# Patient Record
Sex: MALE | Race: NATIVE HAWAIIAN OR OTHER PACIFIC ISLANDER | HISPANIC OR LATINO | Employment: UNEMPLOYED | URBAN - METROPOLITAN AREA
[De-identification: names, ages, dates, MRNs, and addresses within clinical notes are randomized per-mention and may not be internally consistent; named-entity substitution may affect disease eponyms.]

---

## 2023-03-13 PROBLEM — M76.62 ACHILLES TENDINITIS OF LEFT LOWER EXTREMITY: Status: ACTIVE | Noted: 2023-03-13

## 2023-03-13 PROBLEM — E78.5 HYPERLIPIDEMIA: Status: ACTIVE | Noted: 2023-03-13

## 2023-03-13 PROBLEM — M25.50 JOINT PAIN: Status: ACTIVE | Noted: 2023-03-13

## 2023-03-13 PROBLEM — G89.29 CHRONIC HEADACHE: Status: ACTIVE | Noted: 2023-03-13

## 2023-03-13 PROBLEM — M25.572 LEFT ANKLE PAIN: Status: ACTIVE | Noted: 2023-03-13

## 2023-03-13 PROBLEM — R51.9 CHRONIC HEADACHE: Status: ACTIVE | Noted: 2023-03-13

## 2023-03-13 PROBLEM — E11.9 TYPE 2 DIABETES MELLITUS, WITH LONG-TERM CURRENT USE OF INSULIN (MULTI): Status: ACTIVE | Noted: 2023-03-13

## 2023-03-13 PROBLEM — Z79.4 TYPE 2 DIABETES MELLITUS, WITH LONG-TERM CURRENT USE OF INSULIN (MULTI): Status: ACTIVE | Noted: 2023-03-13

## 2023-03-13 RX ORDER — IBUPROFEN 200 MG
CAPSULE ORAL
COMMUNITY
Start: 2022-04-28

## 2023-03-13 RX ORDER — METFORMIN HYDROCHLORIDE 500 MG/1
2 TABLET, EXTENDED RELEASE ORAL 2 TIMES DAILY
COMMUNITY
Start: 2022-05-02

## 2023-03-13 RX ORDER — INSULIN GLARGINE 100 [IU]/ML
INJECTION, SOLUTION SUBCUTANEOUS
COMMUNITY

## 2023-03-13 RX ORDER — CHOLECALCIFEROL (VITAMIN D3) 125 MCG
CAPSULE ORAL
COMMUNITY

## 2023-03-13 RX ORDER — MELOXICAM 15 MG/1
TABLET ORAL
COMMUNITY
Start: 2022-10-17 | End: 2023-03-17 | Stop reason: ALTCHOICE

## 2023-03-13 RX ORDER — ATORVASTATIN CALCIUM 40 MG/1
1 TABLET, FILM COATED ORAL DAILY
COMMUNITY
End: 2023-03-17 | Stop reason: ALTCHOICE

## 2023-03-13 RX ORDER — INSULIN LISPRO 100 [IU]/ML
INJECTION, SOLUTION INTRAVENOUS; SUBCUTANEOUS
COMMUNITY
Start: 2022-05-05

## 2023-03-17 ENCOUNTER — OFFICE VISIT (OUTPATIENT)
Dept: PRIMARY CARE | Facility: CLINIC | Age: 28
End: 2023-03-17
Payer: COMMERCIAL

## 2023-03-17 VITALS
HEIGHT: 65 IN | OXYGEN SATURATION: 96 % | SYSTOLIC BLOOD PRESSURE: 120 MMHG | DIASTOLIC BLOOD PRESSURE: 70 MMHG | BODY MASS INDEX: 33.66 KG/M2 | HEART RATE: 89 BPM | WEIGHT: 202 LBS

## 2023-03-17 DIAGNOSIS — Z79.4 TYPE 2 DIABETES MELLITUS WITHOUT COMPLICATION, WITH LONG-TERM CURRENT USE OF INSULIN (MULTI): ICD-10-CM

## 2023-03-17 DIAGNOSIS — E11.9 TYPE 2 DIABETES MELLITUS WITHOUT COMPLICATION, WITH LONG-TERM CURRENT USE OF INSULIN (MULTI): ICD-10-CM

## 2023-03-17 DIAGNOSIS — F51.01 PRIMARY INSOMNIA: ICD-10-CM

## 2023-03-17 DIAGNOSIS — M25.562 ACUTE PAIN OF LEFT KNEE: Primary | ICD-10-CM

## 2023-03-17 DIAGNOSIS — E11.69 TYPE 2 DIABETES MELLITUS WITH OTHER SPECIFIED COMPLICATION, WITH LONG-TERM CURRENT USE OF INSULIN (MULTI): ICD-10-CM

## 2023-03-17 DIAGNOSIS — Z79.4 TYPE 2 DIABETES MELLITUS WITH OTHER SPECIFIED COMPLICATION, WITH LONG-TERM CURRENT USE OF INSULIN (MULTI): ICD-10-CM

## 2023-03-17 PROCEDURE — 4010F ACE/ARB THERAPY RXD/TAKEN: CPT | Performed by: NURSE PRACTITIONER

## 2023-03-17 PROCEDURE — 3078F DIAST BP <80 MM HG: CPT | Performed by: NURSE PRACTITIONER

## 2023-03-17 PROCEDURE — 99214 OFFICE O/P EST MOD 30 MIN: CPT | Performed by: NURSE PRACTITIONER

## 2023-03-17 PROCEDURE — 3074F SYST BP LT 130 MM HG: CPT | Performed by: NURSE PRACTITIONER

## 2023-03-17 RX ORDER — FLASH GLUCOSE SENSOR
1 KIT MISCELLANEOUS
Qty: 6 EACH | Refills: 1 | Status: SHIPPED | OUTPATIENT
Start: 2023-03-17

## 2023-03-17 RX ORDER — TRAMADOL HYDROCHLORIDE 50 MG/1
50 TABLET ORAL EVERY 6 HOURS PRN
COMMUNITY
Start: 2023-03-08

## 2023-03-17 RX ORDER — PEN NEEDLE, DIABETIC 30 GX3/16"
NEEDLE, DISPOSABLE MISCELLANEOUS
COMMUNITY
Start: 2020-03-26

## 2023-03-17 RX ORDER — FLASH GLUCOSE SENSOR
KIT MISCELLANEOUS
COMMUNITY
Start: 2022-11-16 | End: 2023-03-17 | Stop reason: SDUPTHER

## 2023-03-17 RX ORDER — GABAPENTIN 100 MG/1
100 CAPSULE ORAL 3 TIMES DAILY
COMMUNITY
Start: 2020-07-20

## 2023-03-17 RX ORDER — LISINOPRIL 2.5 MG/1
2.5 TABLET ORAL
COMMUNITY
Start: 2020-03-26

## 2023-03-17 ASSESSMENT — ENCOUNTER SYMPTOMS
NECK PAIN: 1
DYSURIA: 0
DIZZINESS: 0
CHEST TIGHTNESS: 0
SHORTNESS OF BREATH: 0
ABDOMINAL PAIN: 0
BLOOD IN STOOL: 0
NAUSEA: 0
COLOR CHANGE: 0
HEADACHES: 0
JOINT SWELLING: 1
ARTHRALGIAS: 0
LIGHT-HEADEDNESS: 0
FATIGUE: 0
DIARRHEA: 0
MYALGIAS: 0
CONSTIPATION: 0
VOMITING: 0
PALPITATIONS: 0

## 2023-03-17 ASSESSMENT — PATIENT HEALTH QUESTIONNAIRE - PHQ9
1. LITTLE INTEREST OR PLEASURE IN DOING THINGS: NOT AT ALL
2. FEELING DOWN, DEPRESSED OR HOPELESS: NOT AT ALL
SUM OF ALL RESPONSES TO PHQ9 QUESTIONS 1 AND 2: 0

## 2023-03-17 NOTE — PROGRESS NOTES
"Subjective   Patient ID: Trevor Hernandez is a 28 y.o. male who presents for knee pain.    HPI     Trevor returns with left knee pain follow up after being seen by urgent care on march 8th.     2 saturdays ago, cutting down a tree. Monday knee swelled up. [facility] walk in clinic. Xray reported he had \"Sprained knee. Bone effusion.\" Ice and motrin to area for pain relief.    Insomnia:  Sleeping 3 1/2 hours a night.       Review of Systems   Constitutional:  Negative for fatigue.   Respiratory:  Negative for chest tightness and shortness of breath.    Cardiovascular:  Negative for chest pain, palpitations and leg swelling.   Gastrointestinal:  Negative for abdominal pain, blood in stool, constipation, diarrhea, nausea and vomiting.   Genitourinary:  Negative for dysuria.   Musculoskeletal:  Positive for joint swelling (left knee pain) and neck pain (left knee pain). Negative for arthralgias and myalgias.   Skin:  Negative for color change.   Neurological:  Negative for dizziness, light-headedness and headaches.       Objective   /70 (BP Location: Left arm)   Pulse 89   Ht 1.651 m (5' 5\")   Wt 91.6 kg (202 lb)   SpO2 96%   BMI 33.61 kg/m²     Physical Exam  Constitutional:       Appearance: Normal appearance.   Cardiovascular:      Rate and Rhythm: Normal rate and regular rhythm.      Heart sounds: Normal heart sounds.   Pulmonary:      Effort: Pulmonary effort is normal.      Breath sounds: Normal breath sounds.   Musculoskeletal:         General: Tenderness present.      Left knee: Tenderness present.        Legs:    Neurological:      Mental Status: He is alert and oriented to person, place, and time.   Psychiatric:         Mood and Affect: Mood normal.         Behavior: Behavior normal.         Thought Content: Thought content normal.         Judgment: Judgment normal.         Assessment/Plan   Diagnoses and all orders for this visit:  Acute pain of left knee  -     Referral to Orthopaedic Surgery; " Future  Tylenol or aleve as needed for pain  Primary insomnia  - melatonin 5-10 mg as needed at night       Follow up in November for wellness visit.

## 2023-03-17 NOTE — PROGRESS NOTES
"Subjective   Patient ID: Trevor Hernandez is a 28 y.o. male who presents with c/o Urgent Care follow up for left knee sprain.    HPI     Review of Systems    Objective   /70 (BP Location: Left arm)   Pulse 89   Ht 1.651 m (5' 5\")   Wt 91.6 kg (202 lb)   SpO2 96%   BMI 33.61 kg/m²     Physical Exam    Assessment/Plan          "

## 2023-11-03 ENCOUNTER — APPOINTMENT (OUTPATIENT)
Dept: PRIMARY CARE | Facility: CLINIC | Age: 28
End: 2023-11-03
Payer: COMMERCIAL

## 2023-11-10 ENCOUNTER — OFFICE VISIT (OUTPATIENT)
Dept: URGENT CARE | Facility: CLINIC | Age: 28
End: 2023-11-10
Payer: COMMERCIAL

## 2023-11-10 VITALS
HEIGHT: 65 IN | RESPIRATION RATE: 16 BRPM | OXYGEN SATURATION: 98 % | TEMPERATURE: 98.2 F | DIASTOLIC BLOOD PRESSURE: 90 MMHG | WEIGHT: 190 LBS | BODY MASS INDEX: 31.65 KG/M2 | SYSTOLIC BLOOD PRESSURE: 132 MMHG | HEART RATE: 88 BPM

## 2023-11-10 DIAGNOSIS — E11.65 HYPERGLYCEMIA DUE TO DIABETES MELLITUS (MULTI): Primary | ICD-10-CM

## 2023-11-10 PROCEDURE — 99212 OFFICE O/P EST SF 10 MIN: CPT | Performed by: PHYSICIAN ASSISTANT

## 2023-11-10 RX ORDER — MELOXICAM 7.5 MG/1
TABLET ORAL
COMMUNITY
Start: 2023-04-11

## 2023-11-10 NOTE — PROGRESS NOTES
[facility] URGENT CARE HALLIE NOTE:      Name: Trevor Hernandez, 28 y.o.    CSN:[id number]   MRN:[id number]    PCP: GLENN Robins-CNP    ALL:  No Known Allergies    History:    Chief Complaint: UTI    Encounter Date: 11/10/2023      HPI: The history was obtained from the patient. Trevor is a 28 y.o. male, who presents with a hyperglycemic symptoms. Patient states he has had mild confusion x 1 week in the form of slurring words and mumbling. He states he is taking insulin on a sliding scale for DM II. He states his sugars have been running in the 300s. He has decreased oral intake x 1 week. States he stopped taking metformin x2 months d/t possible side effects. He follows with Dr. Comer at [facility] for diabetes management.     PMHx:    Past Medical History:   Diagnosis Date    Anesthesia of skin 08/09/2022    Numbness of hand    Anesthesia of skin 08/09/2022    Numbness of fingers    Anesthesia of skin 08/09/2022    Numbness and tingling    Personal history of other diseases of the digestive system     History of acute pancreatitis              Current Outpatient Medications   Medication Sig Dispense Refill    meloxicam (Mobic) 7.5 mg tablet       blood sugar diagnostic (Blood Glucose Test) strip USE 1 STRIP 4 TIMES DAILY.      blood sugar diagnostic strip Use as directed x5 per day  Dx E11.65   True Metrix strips .      FreeStyle Gagan sensor system (FreeStyle Gagan 2 Sensor) kit Inject 1 each under the skin every 14 (fourteen) days. Use as instructed 6 each 1    gabapentin (Neurontin) 100 mg capsule Take 1 capsule (100 mg) by mouth in the morning and 1 capsule (100 mg) in the evening and 1 capsule (100 mg) before bedtime.      insulin glargine (Lantus) 100 unit/mL (3 mL) pen 24 units in pm      insulin lispro (HumaLOG) 100 unit/mL injection HumaLOG KwikPen 100 UNIT/ML Subcutaneous Solution Pen-injector   Quantity: 15  Refills: 0        Start : 5-May-2022   Active      lisinopril 2.5 mg tablet  "Take 1 tablet (2.5 mg) by mouth once daily.      metFORMIN XR (Glucophage-XR) 500 mg 24 hr tablet Take 2 tablets (1,000 mg) by mouth in the morning and 2 tablets (1,000 mg) before bedtime.      naproxen sodium 220 mg capsule Naproxen Sodium 220 MG Oral Capsule   Refills: 0       Active      pen needle, diabetic 32 gauge x 5/32\" needle Use QID, E11.65 .      traMADol (Ultram) 50 mg tablet Take 1 tablet (50 mg) by mouth every 6 hours if needed.       No current facility-administered medications for this visit.         PMSx:    Past Surgical History:   Procedure Laterality Date    OTHER SURGICAL HISTORY  04/28/2022    [place] tooth extraction    OTHER SURGICAL HISTORY  04/28/2022    Tonsillectomy    OTHER SURGICAL HISTORY  11/30/2022    Abscess incision and drainage    OTHER SURGICAL HISTORY  11/30/2022    Carpal tunnel surgery       Fam Hx:   Family History   Problem Relation Name Age of Onset    Diabetes Mother      Hyperlipidemia Mother      Hypertension Mother      Diabetes Father      Hypertension Father      Hyperlipidemia Father      Thyroid disease Father      Diabetes Other Grandmother     Breast cancer Other Grandmother     Diabetes Other Grandfather     Diabetes Other Aunt     Hypertension Other Aunt     Diabetes Other Uncle     Hypertension Other Uncle        SOC. Hx:     Social History     Socioeconomic History    Marital status:      Spouse name: Not on file    Number of children: Not on file    Years of education: Not on file    Highest education level: Not on file   Occupational History    Not on file   Tobacco Use    Smoking status: Never    Smokeless tobacco: Current     Types: Chew   Vaping Use    Vaping Use: Never used   Substance and Sexual Activity    Alcohol use: Not on file    Drug use: Not on file    Sexual activity: Not on file   Other Topics Concern    Not on file   Social History Narrative    Not on file     Social Determinants of Health     Financial Resource Strain: Not on file "   Food Insecurity: Not on file   Transportation Needs: Not on file   Physical Activity: Not on file   Stress: Not on file   Social Connections: Not on file   Intimate Partner Violence: Not on file   Housing Stability: Not on file         Vitals:    11/10/23 0928   BP: 132/90   Pulse: 88   Resp: 16   Temp: 36.8 °C (98.2 °F)   SpO2: 98%     86.2 kg (190 lb)          Physical Exam  Constitutional:       Appearance: Normal appearance. He is normal weight.   HENT:      Head: Normocephalic and atraumatic.      Nose: Nose normal.      Mouth/Throat:      Mouth: Mucous membranes are moist.   Eyes:      Extraocular Movements: Extraocular movements intact.   Cardiovascular:      Rate and Rhythm: Normal rate and regular rhythm.   Pulmonary:      Effort: Pulmonary effort is normal.      Breath sounds: Normal breath sounds.   Abdominal:      General: Abdomen is flat.   Musculoskeletal:         General: Normal range of motion.      Cervical back: Normal range of motion and neck supple.   Skin:     General: Skin is warm.   Neurological:      Mental Status: He is alert and oriented to person, place, and time.   Psychiatric:         Behavior: Behavior normal.         LABORATORY @ RADIOLOGICAL IMAGING (if done):     No results found for this or any previous visit (from the past 24 hour(s)).     COURSE/MEDICAL DECISION MAKING:    Trevor is a 28 y.o., who presents with a working diagnosis of No diagnosis found. with a differential to include: hyperglycemia, HHS, DKA  -recommended to go to [facility] ED for further workup   -recommended to follow up with Dr. Comer for diabetes management   -educated patient on proper eating/water intake and glucose management        Eunice Villeda PA-C   Advanced Practice Provider  [facility] URGENT CARE

## 2023-12-02 PROBLEM — Z91.199 NON COMPLIANCE WITH MEDICAL TREATMENT: Status: ACTIVE | Noted: 2023-06-01

## 2023-12-08 ENCOUNTER — OFFICE VISIT (OUTPATIENT)
Dept: URGENT CARE | Facility: CLINIC | Age: 28
End: 2023-12-08
Payer: COMMERCIAL

## 2023-12-08 VITALS
OXYGEN SATURATION: 97 % | HEART RATE: 95 BPM | RESPIRATION RATE: 14 BRPM | SYSTOLIC BLOOD PRESSURE: 127 MMHG | DIASTOLIC BLOOD PRESSURE: 84 MMHG | TEMPERATURE: 98.5 F

## 2023-12-08 DIAGNOSIS — J02.0 STREP PHARYNGITIS: Primary | ICD-10-CM

## 2023-12-08 PROCEDURE — 99212 OFFICE O/P EST SF 10 MIN: CPT

## 2023-12-08 PROCEDURE — 99213 OFFICE O/P EST LOW 20 MIN: CPT | Mod: 25

## 2023-12-08 RX ORDER — AMOXICILLIN 500 MG/1
500 CAPSULE ORAL 2 TIMES DAILY
Qty: 20 CAPSULE | Refills: 0 | Status: SHIPPED | OUTPATIENT
Start: 2023-12-08 | End: 2023-12-18

## 2023-12-08 NOTE — PROGRESS NOTES
[facility] URGENT CARE HALLIE NOTE:      Name: Trevor Hernandez, 28 y.o.    CSN:[id number]   MRN:[id number]    PCP: GLENN Robins-CNP    ALL:  No Known Allergies    History:    Chief Complaint: Earache (L EAR/) and Sore Throat (UPPER BACK PAIN/X LAST NIGHT)    Encounter Date: 12/8/2023      HPI: The history was obtained from the patient. Trevor is a 28 y.o. male, who presents with a chief complaint of Earache (L EAR/) and Sore Throat (UPPER BACK PAIN/X LAST NIGHT). He states his throat started hurting yesterday with immediate onset. He states he lacks a cough. He has some upper back pain between his shoulder blades which he thinks is due to sleeping in an odd position. He states his left ear feels like there is a lot of pressure. He denies fevers, N/V, cough, chest pain, sob, abdominal pain.     PMHx:    Past Medical History:   Diagnosis Date    Anesthesia of skin 08/09/2022    Numbness of hand    Anesthesia of skin 08/09/2022    Numbness of fingers    Anesthesia of skin 08/09/2022    Numbness and tingling    Personal history of other diseases of the digestive system     History of acute pancreatitis              Current Outpatient Medications   Medication Sig Dispense Refill    blood sugar diagnostic (Blood Glucose Test) strip USE 1 STRIP 4 TIMES DAILY.      blood sugar diagnostic strip Use as directed x5 per day  Dx E11.65   True Metrix strips .      FreeStyle Gagan sensor system (FreeStyle Gagan 2 Sensor) kit Inject 1 each under the skin every 14 (fourteen) days. Use as instructed 6 each 1    gabapentin (Neurontin) 100 mg capsule Take 1 capsule (100 mg) by mouth 3 times a day.      insulin glargine (Lantus) 100 unit/mL (3 mL) pen 24 units in pm      insulin lispro (HumaLOG) 100 unit/mL injection HumaLOG KwikPen 100 UNIT/ML Subcutaneous Solution Pen-injector   Quantity: 15  Refills: 0        Start : 5-May-2022   Active      lisinopril 2.5 mg tablet Take 1 tablet (2.5 mg) by mouth once daily.    "   meloxicam (Mobic) 7.5 mg tablet       metFORMIN XR (Glucophage-XR) 500 mg 24 hr tablet Take 2 tablets (1,000 mg) by mouth 2 times a day.      naproxen sodium 220 mg capsule Naproxen Sodium 220 MG Oral Capsule   Refills: 0       Active      pen needle, diabetic 32 gauge x 5/32\" needle Use QID, E11.65 .      traMADol (Ultram) 50 mg tablet Take 1 tablet (50 mg) by mouth every 6 hours if needed.       No current facility-administered medications for this visit.         PMSx:    Past Surgical History:   Procedure Laterality Date    OTHER SURGICAL HISTORY  04/28/2022    [place] tooth extraction    OTHER SURGICAL HISTORY  04/28/2022    Tonsillectomy    OTHER SURGICAL HISTORY  11/30/2022    Abscess incision and drainage    OTHER SURGICAL HISTORY  11/30/2022    Carpal tunnel surgery       Fam Hx:   Family History   Problem Relation Name Age of Onset    Diabetes Mother      Hyperlipidemia Mother      Hypertension Mother      Diabetes Father      Hypertension Father      Hyperlipidemia Father      Thyroid disease Father      Diabetes Other Grandmother     Breast cancer Other Grandmother     Diabetes Other Grandfather     Diabetes Other Aunt     Hypertension Other Aunt     Diabetes Other Uncle     Hypertension Other Uncle        SOC. Hx:     Social History     Socioeconomic History    Marital status:      Spouse name: Not on file    Number of children: Not on file    Years of education: Not on file    Highest education level: Not on file   Occupational History    Not on file   Tobacco Use    Smoking status: Never    Smokeless tobacco: Never   Vaping Use    Vaping Use: Never used   Substance and Sexual Activity    Alcohol use: Not on file    Drug use: Not on file    Sexual activity: Not on file   Other Topics Concern    Not on file   Social History Narrative    Not on file     Social Determinants of Health     Financial Resource Strain: Not on file   Food Insecurity: Not on file   Transportation Needs: Not on file "   Physical Activity: Not on file   Stress: Not on file   Social Connections: Not on file   Intimate Partner Violence: Not on file   Housing Stability: Not on file         Vitals:    12/08/23 1631   BP: 127/84   Pulse: 95   Resp: 14   Temp: 36.9 °C (98.5 °F)   SpO2: 97%                Physical Exam  Constitutional:       Appearance: Normal appearance.   HENT:      Right Ear: Tympanic membrane normal.      Left Ear: Ear canal normal. A middle ear effusion is present.      Nose: Nose normal.      Mouth/Throat:      Mouth: Mucous membranes are moist.      Pharynx: Oropharynx is clear. Posterior oropharyngeal erythema present.      Tonsils: Tonsillar exudate present.   Eyes:      Conjunctiva/sclera: Conjunctivae normal.      Pupils: Pupils are equal, round, and reactive to light.   Cardiovascular:      Rate and Rhythm: Normal rate and regular rhythm.   Pulmonary:      Effort: Pulmonary effort is normal.      Breath sounds: Normal breath sounds.   Skin:     General: Skin is warm.   Neurological:      General: No focal deficit present.      Mental Status: He is alert and oriented to person, place, and time.   Psychiatric:         Mood and Affect: Mood normal.         Behavior: Behavior normal.     LABORATORY @ RADIOLOGICAL IMAGING (if done):     No results found for this or any previous visit (from the past 24 hour(s)).    UC COURSE/MEDICAL DECISION MAKING:    Trevor is a 28 y.o., who presents with a working diagnosis of   1. Pharyngitis, unspecified etiology      Trevor was seen today for earache and sore throat.  Diagnoses and all orders for this visit:  Pharyngitis, unspecified etiology (Primary)    Plan is to treat strep throat with amoxicillin 500 mg twice a day for 10 days. Can take otc for symptoms. Encouraged Trevor to hydrate and use saline nasal washes for congestion.     In my medical opinion, I consider Trevor to be low risk for any serious/life-threatening entity, and deem him stable for discharge. This is based on  the information that was available to me at the time of this visit.      As we discussed, he is to return to our office or ER immediately if there is any worsening of his condition.    Eunice Villeda PA-C   Advanced Practice Provider  [facility] URGENT CARE

## 2025-02-12 ENCOUNTER — OFFICE VISIT (OUTPATIENT)
Dept: URGENT CARE | Facility: CLINIC | Age: 30
End: 2025-02-12
Payer: COMMERCIAL

## 2025-02-12 VITALS
SYSTOLIC BLOOD PRESSURE: 131 MMHG | HEART RATE: 93 BPM | DIASTOLIC BLOOD PRESSURE: 92 MMHG | TEMPERATURE: 99.4 F | RESPIRATION RATE: 16 BRPM | OXYGEN SATURATION: 98 %

## 2025-02-12 DIAGNOSIS — R05.1 ACUTE COUGH: ICD-10-CM

## 2025-02-12 DIAGNOSIS — B34.9 NONSPECIFIC SYNDROME SUGGESTIVE OF VIRAL ILLNESS: Primary | ICD-10-CM

## 2025-02-12 LAB
POC CORONAVIRUS 2019 BY PCR (COV19): NOT DETECTED
POC FLU A RESULT: NOT DETECTED
POC FLU B RESULT: NOT DETECTED
POC RSV PCR: NOT DETECTED

## 2025-02-12 PROCEDURE — 99213 OFFICE O/P EST LOW 20 MIN: CPT | Performed by: NURSE PRACTITIONER

## 2025-02-12 RX ORDER — BROMPHENIRAMINE MALEATE, PSEUDOEPHEDRINE HYDROCHLORIDE, AND DEXTROMETHORPHAN HYDROBROMIDE 2; 30; 10 MG/5ML; MG/5ML; MG/5ML
5 SYRUP ORAL 4 TIMES DAILY PRN
Qty: 120 ML | Refills: 0 | Status: SHIPPED | OUTPATIENT
Start: 2025-02-12 | End: 2025-02-22

## 2025-02-12 RX ORDER — ALBUTEROL SULFATE 90 UG/1
2 INHALANT RESPIRATORY (INHALATION) EVERY 4 HOURS PRN
Qty: 18 G | Refills: 0 | Status: SHIPPED | OUTPATIENT
Start: 2025-02-12 | End: 2025-03-14

## 2025-02-12 NOTE — LETTER
February 12, 2025     Patient: Trevor Hernandez   YOB: 1995   Date of Visit: 2/12/2025       To Whom It May Concern:    It is my medical opinion that Trevor Hernandez may return to work on 2/15/2025 .    If you have any questions or concerns, please don't hesitate to call.         Sincerely,        GLENN Larose-CNP    CC: No Recipients

## 2025-02-12 NOTE — PROGRESS NOTES
[facility] URGENT CARE   HALLIE NOTE:      Name: Trevor Hernandez, 30 y.o.    CSN:[id number]   MRN:[id number]    PCP: GLENN Robins-CNP    ALL:  No Known Allergies    History:    Chief Complaint: Flu Symptoms (Nausea, body aches, HA x 3 days )    Encounter Date: 2/12/2025      HPI: The history was obtained from the patient. Trevor is a 30 y.o. male, who presents with a chief complaint of Flu Symptoms (Nausea, body aches, HA x 3 days ). Sinus pressure, pain, and nasal congestion, bilateral ear pain/fullness, productive cough, mild sore throat, fever, chills, body aches, and fatigue. Mild shortness of breath with activity, denies wheezing. Symptoms began 3 days ago, reports symptoms have progressively worsened since onset. Denies any abdominal pain, chest pain, rashes, urinary symptoms, vomiting, and diarrhea. Denies any lightheadedness or dizziness; no changes in mental status.  Appetite is decreased; is able to eat and drink fluids without difficulty. Has been taking utilized cold and flu  without much relief. Works at local school.  No known history of asthma/COPD/respiratory issues. Denies any recent antibiotic use      PMHx:    Past Medical History:   Diagnosis Date    Anesthesia of skin 08/09/2022    Numbness of hand    Anesthesia of skin 08/09/2022    Numbness of fingers    Anesthesia of skin 08/09/2022    Numbness and tingling    Personal history of other diseases of the digestive system     History of acute pancreatitis              Current Outpatient Medications   Medication Sig Dispense Refill    blood sugar diagnostic (Blood Glucose Test) strip USE 1 STRIP 4 TIMES DAILY.      blood sugar diagnostic strip Use as directed x5 per day  Dx E11.65   True Metrix strips .      FreeStyle Gagan sensor system (FreeStyle Gagan 2 Sensor) kit Inject 1 each under the skin every 14 (fourteen) days. Use as instructed 6 each 1    gabapentin (Neurontin) 100 mg capsule Take 1 capsule (100 mg) by mouth 3  "times a day.      insulin glargine (Lantus) 100 unit/mL (3 mL) pen 24 units in pm      insulin lispro (HumaLOG) 100 unit/mL injection HumaLOG KwikPen 100 UNIT/ML Subcutaneous Solution Pen-injector   Quantity: 15  Refills: 0        Start : 5-May-2022   Active      lisinopril 2.5 mg tablet Take 1 tablet (2.5 mg) by mouth once daily.      meloxicam (Mobic) 7.5 mg tablet       metFORMIN XR (Glucophage-XR) 500 mg 24 hr tablet Take 2 tablets (1,000 mg) by mouth 2 times a day.      naproxen sodium 220 mg capsule Naproxen Sodium 220 MG Oral Capsule   Refills: 0       Active      pen needle, diabetic 32 gauge x 5/32\" needle Use QID, E11.65 .      traMADol (Ultram) 50 mg tablet Take 1 tablet (50 mg) by mouth every 6 hours if needed.      albuterol 90 mcg/actuation inhaler Inhale 2 puffs every 4 hours if needed for wheezing or shortness of breath. 18 g 0    brompheniramine-pseudoeph-DM 2-30-10 mg/5 mL syrup Take 5 mL by mouth 4 times a day as needed for congestion or cough for up to 10 days. 120 mL 0     No current facility-administered medications for this visit.         PMSx:    Past Surgical History:   Procedure Laterality Date    OTHER SURGICAL HISTORY  04/28/2022    [place] tooth extraction    OTHER SURGICAL HISTORY  04/28/2022    Tonsillectomy    OTHER SURGICAL HISTORY  11/30/2022    Abscess incision and drainage    OTHER SURGICAL HISTORY  11/30/2022    Carpal tunnel surgery       Fam Hx:   Family History   Problem Relation Name Age of Onset    Diabetes Mother      Hyperlipidemia Mother      Hypertension Mother      Diabetes Father      Hypertension Father      Hyperlipidemia Father      Thyroid disease Father      Diabetes Other Grandmother     Breast cancer Other Grandmother     Diabetes Other Grandfather     Diabetes Other Aunt     Hypertension Other Aunt     Diabetes Other Uncle     Hypertension Other Uncle        SOC. Hx:     Social History     Socioeconomic History    Marital status:      Spouse name: Not on " file    Number of children: Not on file    Years of education: Not on file    Highest education level: Not on file   Occupational History    Not on file   Tobacco Use    Smoking status: Never    Smokeless tobacco: Never   Vaping Use    Vaping status: Never Used   Substance and Sexual Activity    Alcohol use: Not on file    Drug use: Not on file    Sexual activity: Not on file   Other Topics Concern    Not on file   Social History Narrative    Not on file     Social Drivers of Health     Financial Resource Strain: Not on file   Food Insecurity: No Food Insecurity (8/17/2020)    [facility]    Hunger Vital Sign     Worried About Running Out of Food in the Last Year: Never true     Ran Out of Food in the Last Year: Never true   Transportation Needs: Not on file   Physical Activity: Not on file   Stress: Not on file   Social Connections: Unknown (7/20/2020)    [facility]    Social Connection and Isolation Panel [NHANES]     Frequency of Communication with Friends and Family: Not on file     Frequency of Social Gatherings with Friends and Family: Three times a week     Attends Mosque Services: Not on file     Active Member of Clubs or Organizations: Not on file     Attends Club or Organization Meetings: Not on file     Marital Status: Not on file   Intimate Partner Violence: Not on file   Housing Stability: Not on file         Vitals:    02/12/25 1152   BP: (!) 131/92   Pulse: 93   Resp: 16   Temp: 37.4 °C (99.4 °F)   SpO2: 98%                Physical Exam  Vitals and nursing note reviewed.   Constitutional:       General: He is not in acute distress.     Appearance: Normal appearance.   HENT:      Head: Normocephalic and atraumatic.      Right Ear: Hearing, ear canal and external ear normal. A middle ear effusion is present. Tympanic membrane is erythematous. Tympanic membrane is not scarred, retracted or bulging. Tympanic membrane has normal mobility.      Left Ear:  Hearing, ear canal and external ear normal. A middle ear effusion is present. Tympanic membrane is erythematous. Tympanic membrane is not scarred, retracted or bulging. Tympanic membrane has normal mobility.      Nose: Congestion and rhinorrhea present. Rhinorrhea is purulent.      Right Sinus: Maxillary sinus tenderness and frontal sinus tenderness present.      Left Sinus: Maxillary sinus tenderness and frontal sinus tenderness present.      Mouth/Throat:      Lips: Pink.      Mouth: Mucous membranes are moist.      Pharynx: Uvula midline. Posterior oropharyngeal erythema present. No pharyngeal swelling or oropharyngeal exudate.      Tonsils: No tonsillar exudate.   Cardiovascular:      Rate and Rhythm: Normal rate and regular rhythm.      Heart sounds: Normal heart sounds.   Pulmonary:      Effort: Pulmonary effort is normal. No respiratory distress.      Breath sounds: Normal breath sounds. No stridor. No wheezing, rhonchi or rales.   Chest:      Chest wall: No tenderness.   Abdominal:      General: Bowel sounds are normal.   Skin:     General: Skin is warm and dry.      Capillary Refill: Capillary refill takes less than 2 seconds.   Neurological:      Mental Status: He is alert and oriented to person, place, and time.           LABORATORY @ RADIOLOGICAL IMAGING (if done):     Results for orders placed or performed in visit on 02/12/25 (from the past 24 hours)   POCT SARS-COV-2/FLU/RSV PCR SYMPTOMATIC manually resulted   Result Value Ref Range    POC Coronavirus 2019, PCR Not Detected Not Detected    POC Flu A Result Not Detected Not Detected    POC Flu B Result Not Detected Not Detected    POC RSV PCR Not Detected Not Detected       ____________________________________________________________________    I did personally review Trevor's past medical history, surgical history, social history, as well as family history (when relevant).  In this case, I also oversaw the his drug management by reviewing his medication  list, allergy list, as well as the medications that I prescribed during the UC course and/or recommended as an out-patient (including possible OTC medications such as acetaminophen, NSAIDs , etc).    After reviewing the items above, I did look at previous medical documentation, such as recent hospitalizations, office visits, and/or recent consultations with PCP/specialist.                          SDOH:   Another factor that I considered in Trevor's care was his Social Determinants of Health (SDOH). During this UC encounter, he did not have social determinants of health. Those SDOH influencing Trevor's care are: none      _____________________________________________________________________      UC COURSE/MEDICAL DECISION MAKING:    Trevor is a 30 y.o., who presents with a working diagnosis of   1. Nonspecific syndrome suggestive of viral illness    2. Acute cough        Trevor was seen today for flu symptoms.  Diagnoses and all orders for this visit:  Nonspecific syndrome suggestive of viral illness (Primary)  -     brompheniramine-pseudoeph-DM 2-30-10 mg/5 mL syrup; Take 5 mL by mouth 4 times a day as needed for congestion or cough for up to 10 days.  -     POCT SARS-COV-2/FLU/RSV PCR SYMPTOMATIC manually resulted  -     albuterol 90 mcg/actuation inhaler; Inhale 2 puffs every 4 hours if needed for wheezing or shortness of breath.  Acute cough  -     POCT SARS-COV-2/FLU/RSV PCR SYMPTOMATIC manually resulted         Plan of care reviewed with patient.  If symptoms change and/or worsen will follow-up with primary care provider, return to urgent care, or go to the nearest emergency department for further evaluation.  Patient states understanding and is agreeable with plan of care.      GLENN Ewing, DNP   Advanced Practice Provider  [facility] URGENT CARE    Please note: While the patient may or may not have received printed discharge paperwork, all relevant medical findings, test results, and  treatment details are accessible through the electronic medical record system. The patient is encouraged to review their chart via the patient portal for comprehensive information and follow-up instructions.

## 2025-03-27 ENCOUNTER — OFFICE VISIT (OUTPATIENT)
Dept: URGENT CARE | Facility: CLINIC | Age: 30
End: 2025-03-27
Payer: COMMERCIAL

## 2025-03-27 ENCOUNTER — HOSPITAL ENCOUNTER (OUTPATIENT)
Dept: RADIOLOGY | Facility: CLINIC | Age: 30
Discharge: HOME | End: 2025-03-27
Payer: COMMERCIAL

## 2025-03-27 VITALS
HEART RATE: 90 BPM | TEMPERATURE: 97.4 F | SYSTOLIC BLOOD PRESSURE: 141 MMHG | RESPIRATION RATE: 14 BRPM | OXYGEN SATURATION: 97 % | DIASTOLIC BLOOD PRESSURE: 93 MMHG

## 2025-03-27 DIAGNOSIS — Z86.39 H/O DIABETES MELLITUS: ICD-10-CM

## 2025-03-27 DIAGNOSIS — S93.401A MODERATE RIGHT ANKLE SPRAIN, INITIAL ENCOUNTER: Primary | ICD-10-CM

## 2025-03-27 DIAGNOSIS — S99.921A RIGHT FOOT INJURY, INITIAL ENCOUNTER: ICD-10-CM

## 2025-03-27 PROCEDURE — 73630 X-RAY EXAM OF FOOT: CPT | Mod: RT

## 2025-03-27 PROCEDURE — 73590 X-RAY EXAM OF LOWER LEG: CPT | Mod: RT

## 2025-03-27 PROCEDURE — 99212 OFFICE O/P EST SF 10 MIN: CPT | Performed by: PHYSICIAN ASSISTANT

## 2025-03-27 NOTE — PROGRESS NOTES
[facility] URGENT CARE   HALLIE NOTE:      Name: Trevor Hernandez, 30 y.o.    CSN:[id number]   MRN:[id number]    PCP: No primary care provider on file.    ALL:  No Known Allergies    History:    Chief Complaint: R ankle and knee pain (X today after twisting his ankle at work)    Encounter Date: 3/27/2025      HPI: The history was obtained from the patient. Trevor is a 30 y.o. male, who presents with a chief complaint of R ankle and knee pain (X today after twisting his ankle at work) during shift at [employer]; he was walking with a student on the playground and his foot went off the black top into the mulch, and he suffered an inversion injury causing pain to his ankle/foot & right lateral knee.    Also he's been out of his DM meds and hasn't had treatment in some time. I've encouraged he est. With ILENE Comer, who he has had success with in the past.     PMHx:    Past Medical History:   Diagnosis Date    Anesthesia of skin 08/09/2022    Numbness of hand    Anesthesia of skin 08/09/2022    Numbness of fingers    Anesthesia of skin 08/09/2022    Numbness and tingling    Personal history of other diseases of the digestive system     History of acute pancreatitis              Current Outpatient Medications   Medication Sig Dispense Refill    albuterol 90 mcg/actuation inhaler Inhale 2 puffs every 4 hours if needed for wheezing or shortness of breath. 18 g 0     No current facility-administered medications for this visit.         PMSx:    Past Surgical History:   Procedure Laterality Date    OTHER SURGICAL HISTORY  04/28/2022    [place] tooth extraction    OTHER SURGICAL HISTORY  04/28/2022    Tonsillectomy    OTHER SURGICAL HISTORY  11/30/2022    Abscess incision and drainage    OTHER SURGICAL HISTORY  11/30/2022    Carpal tunnel surgery       Fam Hx:   Family History   Problem Relation Name Age of Onset    Diabetes Mother      Hyperlipidemia Mother      Hypertension Mother       Diabetes Father      Hypertension Father      Hyperlipidemia Father      Thyroid disease Father      Diabetes Other Grandmother     Breast cancer Other Grandmother     Diabetes Other Grandfather     Diabetes Other Aunt     Hypertension Other Aunt     Diabetes Other Uncle     Hypertension Other Uncle        SOC. Hx:     Social History     Socioeconomic History    Marital status:      Spouse name: Not on file    Number of children: Not on file    Years of education: Not on file    Highest education level: Not on file   Occupational History    Not on file   Tobacco Use    Smoking status: Never    Smokeless tobacco: Never   Vaping Use    Vaping status: Never Used   Substance and Sexual Activity    Alcohol use: Not on file    Drug use: Not on file    Sexual activity: Not on file   Other Topics Concern    Not on file   Social History Narrative    Not on file     Social Drivers of Health     Financial Resource Strain: Not on file   Food Insecurity: No Food Insecurity (8/17/2020)    [facility]    Hunger Vital Sign     Worried About Running Out of Food in the Last Year: Never true     Ran Out of Food in the Last Year: Never true   Transportation Needs: Not on file   Physical Activity: Not on file   Stress: Not on file   Social Connections: Unknown (7/20/2020)    [facility]    Social Connection and Isolation Panel [NHANES]     Frequency of Communication with Friends and Family: Not on file     Frequency of Social Gatherings with Friends and Family: Three times a week     Attends Jewish Services: Not on file     Active Member of Clubs or Organizations: Not on file     Attends Club or Organization Meetings: Not on file     Marital Status: Not on file   Intimate Partner Violence: Not on file   Housing Stability: Not on file         Vitals:    03/27/25 1522   BP: (!) 141/93   Pulse: 90   Resp: 14   Temp: 36.3 °C (97.4 °F)   SpO2: 97%                Physical  Exam  Vitals reviewed.   Constitutional:       Appearance: Normal appearance. He is obese.   HENT:      Head: Atraumatic.      Nose: Nose normal.   Eyes:      Extraocular Movements: Extraocular movements intact.      Pupils: Pupils are equal, round, and reactive to light.      Comments: Wearing glasses   Cardiovascular:      Pulses: Normal pulses.   Pulmonary:      Effort: Pulmonary effort is normal.   Musculoskeletal:         General: Swelling and tenderness present.      Cervical back: Normal range of motion.        Legs:         Feet:    Skin:     General: Skin is warm and dry.      Capillary Refill: Capillary refill takes less than 2 seconds.      Findings: No bruising or rash.   Neurological:      General: No focal deficit present.      Mental Status: He is alert.           LABORATORY @ RADIOLOGICAL IMAGING (if done):     Interpreted By:  Marco Antonio Murrieta,   STUDY:  XR TIBIA FIBULA RIGHT 2 VIEWS; ;  3/27/2025 4:05 pm      INDICATION:  Signs/Symptoms:inversion injury, fibular pain.      ,S99.921A Unspecified injury of right foot, initial encounter      COMPARISON:  None.      ACCESSION NUMBER(S):  [id number]      ORDERING CLINICIAN:  JOSÉ MIGUEL HOLLEY      FINDINGS:  No acute fracture or malalignment. No significant degenerative  changes. No radiopaque foreign bodies or soft tissue gas.      IMPRESSION:  No acute osseous abnormality.          MACRO:  None.      Signed by: Marco Antonio Murrieta 3/27/2025 4:37 PM  Dictation workstation:   [id number]        Interpreted By:  Marco Antonio Murrieta,   STUDY:  XR FOOT RIGHT 3+ VIEWS; ;  3/27/2025 4:05 pm      INDICATION:  Signs/Symptoms:5th metatarsal pain s/p inversion injury.      ,S99.921A Unspecified injury of right foot, initial encounter      COMPARISON  None.      ACCESSION NUMBER(S):  [id number]      ORDERING CLINICIAN:  JOSÉ MIGUEL HOLLEY      FINDINGS:  Plantar calcaneal spur. Enthesopathic spurring at the Achilles tendon  insertion. No acute fracture or  malalignment. The Lisfranc joint is  intact.      IMPRESSION:  No acute osseous abnormality.          MACRO:  None.      Signed by: Marco Antonio Murrieta 3/27/2025 4:37 PM  Dictation workstation:   [id number]  ____________________________________________________________________    I did personally review Trevor's past medical history, surgical history, social history, as well as family history (when relevant).  In this case, I also oversaw the his drug management by reviewing his medication list, allergy list, as well as the medications that I prescribed during the UC course and/or recommended as an out-patient (including possible OTC medications such as acetaminophen, NSAIDs , etc).    After reviewing the items above, I did look at previous medical documentation, such as recent hospitalizations, office visits, and/or recent consultations with PCP/specialist.                          SDOH:   Another factor that I considered in Trevor's care was his Social Determinants of Health (SDOH). During this UC encounter, he did not have social determinants of health. Those SDOH influencing Trevor's care are: none      _____________________________________________________________________      UC COURSE/MEDICAL DECISION MAKING:    Trevor is a 30 y.o., who presents with a working diagnosis of   1. Moderate right ankle sprain, initial encounter    2. H/O diabetes mellitus    3. Right foot injury, initial encounter     with a differential to include: Sprain, strain, contusion, fracture, avulsion fracture, dislocation, tendinitis, tenosynovitis    Discussed use of tall walking boot, immediately when he stood up he felt better and more secure in the right leg. He was counseled on ROM exercises & use judiciously with NSAID  I've recommended he follow through with Dr. Comer, showed him how to set up an appt.; he's not able to get in I'm willing to send any DM meds          Tiago Acevedo PA-C   Advanced Practice Provider   [facility] URGENT CARE    Please note: While the patient may or may not have received printed discharge paperwork, all relevant medical findings, test results, and treatment details are accessible through the electronic medical record system. The patient is encouraged to review their chart via the patient portal for comprehensive information and follow-up instructions.

## 2025-04-01 ENCOUNTER — HOSPITAL ENCOUNTER (OUTPATIENT)
Dept: RADIOLOGY | Facility: CLINIC | Age: 30
Discharge: HOME | End: 2025-04-01
Payer: COMMERCIAL

## 2025-04-01 DIAGNOSIS — S83.8X1A SPRAIN OF OTHER SPECIFIED PARTS OF RIGHT KNEE, INITIAL ENCOUNTER: ICD-10-CM

## 2025-04-01 PROCEDURE — 73560 X-RAY EXAM OF KNEE 1 OR 2: CPT | Mod: RT

## 2025-04-22 ENCOUNTER — APPOINTMENT (OUTPATIENT)
Dept: PHYSICAL THERAPY | Facility: CLINIC | Age: 30
End: 2025-04-22
Payer: COMMERCIAL

## 2025-04-28 ENCOUNTER — EVALUATION (OUTPATIENT)
Dept: PHYSICAL THERAPY | Facility: CLINIC | Age: 30
End: 2025-04-28
Payer: COMMERCIAL

## 2025-04-28 DIAGNOSIS — S93.401A SPRAIN OF UNSPECIFIED LIGAMENT OF RIGHT ANKLE, INITIAL ENCOUNTER: ICD-10-CM

## 2025-04-28 DIAGNOSIS — S83.91XA SPRAIN OF UNSPECIFIED SITE OF RIGHT KNEE, INITIAL ENCOUNTER: ICD-10-CM

## 2025-04-28 PROCEDURE — 97161 PT EVAL LOW COMPLEX 20 MIN: CPT | Mod: GP | Performed by: PHYSICAL THERAPIST

## 2025-04-28 PROCEDURE — 97110 THERAPEUTIC EXERCISES: CPT | Mod: GP | Performed by: PHYSICAL THERAPIST

## 2025-04-28 NOTE — PROGRESS NOTES
"Physical Therapy    Physical Therapy Evaluation and Treatment      Patient Name: Trevor Hernandez  MRN: [id number]  Today's Date: 4/28/2025    Time Entry:   Time Calculation  Start Time: 1540  Stop Time: 1505  Time Calculation (min): 1405 min  PT Evaluation Time Entry  PT Evaluation (Low) Time Entry: 10  PT Therapeutic Procedures Time Entry  Therapeutic Exercise Time Entry: 8    Assessment:  PT Assessment  PT Assessment Results: Decreased strength, Decreased range of motion, Decreased endurance, Impaired balance  Rehab Prognosis: Good  Evaluation/Treatment Tolerance: Patient tolerated treatment well  Strengths: Ability to acquire knowledge  Assessment Comment: Will continue to benefit from PT.     Signs and symptom consistent with R ankle sprain and possible R knee meniscus pathology. Discussed need for PT for symptoms. Patient in agreement.     Plan:  OP PT Plan  Treatment/Interventions: Cryotherapy, Aquatic therapy, Gait training, Hot pack, Manual therapy, Neuromuscular re-education, Therapeutic activities, Therapeutic exercises  PT Plan: Skilled PT  PT Frequency: 2 times per week  Duration: 3 weeks or as needed  Onset Date: 04/28/25  Rehab Potential: Good    Current Problem:   1. Sprain of unspecified ligament of right ankle, initial encounter  Referral to Physical Therapy    Follow Up In Physical Therapy      2. Sprain of unspecified site of right knee, initial encounter  Referral to Physical Therapy    Follow Up In Physical Therapy        Subjective    Patient is a 30-year-old male who presents with R knee and ankle sprain after a twisting injury at work on 3/27/25. His R ankle feels weak. His R knee has intermittent pain and \"popping.\" His R knee can feel like its \"giving out.\" Overall, his R ankle has gotten better. He is wearing a R ankle brace and is doing R ankle ROM exercises. His R ankle pain is a 3/10 and his R knee pain is a 3/10. Denies any R LE surgeries. Denies any numbness or tingling. XR of R LE several " weeks ago were all negative for fractures. He would like to try PT at this time. Current HEP includes walking, standing calf stretch, ankle ROM, and LAQ.    Objective   Ambulates with brace on R ankle    R ankle AROM  15 degrees DF  50 degrees PF  40 degrees eversion  20 degrees inversion    R ankle ROM is WFL    R ankle strength:  4+/5 DF  4+/5 PF  4+/5 eversion   4+/5 inversion     Positive Chelsea's on R for click (lateral)  Mild tenderness to R lateral joint line     Outcome Measures:  LEFS 70    Treatments:  4 way ankle green x 5 HEP  Side lying hip abduction x 10 HEP  Bridges x 10 HEP  Nu step next time  Mini squats next time  Standing 3 way hip next time  Foam exercises for R ankle next time    EDUCATION:  Outpatient Education  Individual(s) Educated: Patient  Education Provided: Anatomy, Home Exercise Program, POC  Risk and Benefits Discussed with Patient/Caregiver/Other: yes  Patient/Caregiver Demonstrated Understanding: yes  Plan of Care Discussed and Agreed Upon: yes  Patient Response to Education: Patient/Caregiver Verbalized Understanding of Information  Education Comment: HEP, POC, anatomy    Goals:  Active       Goals       HEP       Start:  04/28/25    Expected End:  12/31/25       Patient will be independent with HEP to improve self management.          Strength       Start:  04/28/25    Expected End:  12/31/25       Patient will improve R LE strength to 5/5 to increase function.          Pain       Start:  04/28/25    Expected End:  12/31/25       Patient will decrease pain to 1/10 to improve function.

## 2025-04-30 ENCOUNTER — TREATMENT (OUTPATIENT)
Dept: PHYSICAL THERAPY | Facility: CLINIC | Age: 30
End: 2025-04-30
Payer: COMMERCIAL

## 2025-04-30 DIAGNOSIS — S93.401A SPRAIN OF UNSPECIFIED LIGAMENT OF RIGHT ANKLE, INITIAL ENCOUNTER: ICD-10-CM

## 2025-04-30 DIAGNOSIS — S83.91XA SPRAIN OF UNSPECIFIED SITE OF RIGHT KNEE, INITIAL ENCOUNTER: ICD-10-CM

## 2025-04-30 PROCEDURE — 97110 THERAPEUTIC EXERCISES: CPT | Mod: GP,CQ

## 2025-04-30 ASSESSMENT — PAIN SCALES - GENERAL: PAINLEVEL_OUTOF10: 3

## 2025-04-30 ASSESSMENT — PAIN - FUNCTIONAL ASSESSMENT: PAIN_FUNCTIONAL_ASSESSMENT: 0-10

## 2025-04-30 NOTE — PROGRESS NOTES
"Physical Therapy Treatment    Patient Name: Trevor Hernandez  MRN: [id number]  Today's Date: 4/30/2025  Time Calculation  Start Time: 1315  Stop Time: 1359  Time Calculation (min): 44 min    PT Therapeutic Procedures Time Entry  Therapeutic Exercise Time Entry: 43       Assessment: Patient identified by name and date of birth. Patient was able to advance to standing LE strengthening with good tolerance and no increase in pain. Patient required cues for form with squat, some R knee pain with cues to reduce ROM. Patient was able to balance on R LE without UE support.     PT Assessment  PT Assessment Results: Decreased strength, Decreased range of motion, Decreased endurance, Impaired balance  Rehab Prognosis: Good  Assessment Comment: Will continue to benefit from PT.    Plan: Plan to continue with R LE strengthening and balance training to allow for improved stability with job duties.     OP PT Plan  Treatment/Interventions: Cryotherapy, Aquatic therapy, Gait training, Hot pack, Manual therapy, Neuromuscular re-education, Therapeutic activities, Therapeutic exercises  PT Plan: Skilled PT  PT Frequency: 2 times per week  Duration: 3 weeks or as needed  Onset Date: 04/28/25  Rehab Potential: Good      Subjective  Patient reports compliance with HEP and expressed good understanding. Patient reports R knee pain of 3/10 and R ankle pain of 4/10.           Precautions     Pain  Pain Assessment: 0-10  0-10 (Numeric) Pain Score: 3  Pain Location: Knee          Current Problem  1. Sprain of unspecified ligament of right ankle, initial encounter  Follow Up In Physical Therapy      2. Sprain of unspecified site of right knee, initial encounter  Follow Up In Physical Therapy          Reason for Referral: R ankle and knee pain  Referred By: GLENN Sylvester  Past Medical History Relevant to Rehab: No R LE surgeries  Objective:   Fair Balance reactions with SLS     Treatments:   Nu step 5' N  Slant board stretch 30\" x 2 N  Standing 3 way " "hip 2x10 N  SLS 30\" x 2 N  Heel Raises 2x10 N  Mini Squat x10 N  4 way ankle green 2x10  SLR x10  Side lying hip abduction x 10   Bridges x 10     OP EDUCATION:  Access Code: [email or web address]  URL: [email or web address]  Date: 04/30/2025  Prepared by: Estela Garrison    Exercises  - Standing Hip Abduction with Counter Support  - 1 x daily - 7 x weekly - 1-3 sets - 10 reps  - Standing Hip Extension with Counter Support  - 1 x daily - 7 x weekly - 1-3 sets - 10 reps  - Standing Hip Flexion AROM  - 1 x daily - 7 x weekly - 1-3 sets - 10 reps  - Single Leg Stance with Support  - 1 x daily - 7 x weekly - 1-3 sets - 10 reps  - Standing Heel Raise with Support  - 1 x daily - 7 x weekly - 1-3 sets - 10 reps  - Mini Squat  - 1 x daily - 7 x weekly - 1-3 sets - 10 reps  Outpatient Education  Individual(s) Educated: Patient  Education Provided: Anatomy, Home Exercise Program, POC  Risk and Benefits Discussed with Patient/Caregiver/Other: yes  Patient/Caregiver Demonstrated Understanding: yes  Plan of Care Discussed and Agreed Upon: yes  Patient Response to Education: Patient/Caregiver Verbalized Understanding of Information  Education Comment: HEP, POC, anatomy    Goals:  Active       Goals       HEP       Start:  04/28/25    Expected End:  12/31/25       Patient will be independent with HEP to improve self management.          Strength       Start:  04/28/25    Expected End:  12/31/25       Patient will improve R LE strength to 5/5 to increase function.          Pain       Start:  04/28/25    Expected End:  12/31/25       Patient will decrease pain to 1/10 to improve function.             "

## 2025-05-06 ENCOUNTER — TREATMENT (OUTPATIENT)
Dept: PHYSICAL THERAPY | Facility: CLINIC | Age: 30
End: 2025-05-06
Payer: COMMERCIAL

## 2025-05-06 DIAGNOSIS — S93.401A SPRAIN OF UNSPECIFIED LIGAMENT OF RIGHT ANKLE, INITIAL ENCOUNTER: ICD-10-CM

## 2025-05-06 DIAGNOSIS — S83.91XA SPRAIN OF UNSPECIFIED SITE OF RIGHT KNEE, INITIAL ENCOUNTER: ICD-10-CM

## 2025-05-06 PROCEDURE — 97110 THERAPEUTIC EXERCISES: CPT | Mod: GP,CQ

## 2025-05-06 ASSESSMENT — PAIN SCALES - GENERAL: PAINLEVEL_OUTOF10: 4

## 2025-05-06 ASSESSMENT — PAIN - FUNCTIONAL ASSESSMENT: PAIN_FUNCTIONAL_ASSESSMENT: 0-10

## 2025-05-06 NOTE — PROGRESS NOTES
"Physical Therapy Treatment    Patient Name: Trevor Hernandez  MRN: [id number]  Today's Date: 5/6/2025  Time Calculation  Start Time: 0915  Stop Time: 0956  Time Calculation (min): 41 min  PT Therapeutic Procedures Time Entry  Therapeutic Exercise Time Entry: 39         Current Problem  Problem List Items Addressed This Visit    None  Visit Diagnoses         Codes      Sprain of unspecified ligament of right ankle, initial encounter     S93.401A      Sprain of unspecified site of right knee, initial encounter     S83.91XA            Subjective   Pt.'s name and birthday confirmed.  Pt. Is compliant with HEP.  Pt. Reports 4/10 pain with right knee and 1/10 pain with right ankle.  States his knee was really sore last night.  No c/o recent falls.  Pt. Wearing ankle brace this date.    Reason for Referral: R ankle and knee pain  Referred By: GLENN Sylvester  Past Medical History Relevant to Rehab: No R LE surgeries  General Comment: visit #3      Precautions         Pain  Pain Assessment: 0-10  0-10 (Numeric) Pain Score: 4  Pain Location: Knee    Objective:    Treatments:  Nu step 5'  Slant board stretch 60\" x 2 (P)  Step ups 4\" R x10 (N)  Standing 3 way hip 2x10   SLS 30\" x 2   Heel Raises 2x10  Mini Squat x10 (knee pain)  4 way ankle green 2x10  SLR R  2 x10 (P)  Side lying hip abduction R 2 x10 (P)   Bridges 2 x10 (P)   Ankle ABC's x1 cycle (N)               OP EDUCATION:  Access Code: [email or web address]  URL: [email or web address]  Date: 04/30/2025  Prepared by: Estela Garrison     Exercises  - Standing Hip Abduction with Counter Support  - 1 x daily - 7 x weekly - 1-3 sets - 10 reps  - Standing Hip Extension with Counter Support  - 1 x daily - 7 x weekly - 1-3 sets - 10 reps  - Standing Hip Flexion AROM  - 1 x daily - 7 x weekly - 1-3 sets - 10 reps  - Single Leg Stance with Support  - 1 x daily - 7 x weekly - 1-3 sets - 10 reps  - Standing Heel Raise with Support  - 1 x daily - 7 x weekly - 1-3 sets - 10 " reps  - Mini Squat  - 1 x daily - 7 x weekly - 1-3 sets - 10 reps  Outpatient Education  Individual(s) Educated: Patient  Education Provided: Anatomy, Home Exercise Program, POC  Risk and Benefits Discussed with Patient/Caregiver/Other: yes  Patient/Caregiver Demonstrated Understanding: yes  Plan of Care Discussed and Agreed Upon: yes  Patient Response to Education: Patient/Caregiver Verbalized Understanding of Information  Education Comment: HEP, POC, anatomy           Assessment:  Verbal cues needed with TE.  Able to progress reps with fair tolerance.  Pt. Notes pain with knee while trying mini squats.  Soreness noted with ankle as well after session.         Plan:  Continue with strengthening, ROM and flexibility per tolerance to improve daily activities/work duties with little to no difficulty.  MB       OP PT Plan  Treatment/Interventions: Cryotherapy, Aquatic therapy, Gait training, Hot pack, Manual therapy, Neuromuscular re-education, Therapeutic activities, Therapeutic exercises  PT Plan: Skilled PT  PT Frequency: 2 times per week  Duration: 3 weeks or as needed  Onset Date: 04/28/25  Rehab Potential: Good              Goals:  Active       Goals       HEP       Start:  04/28/25    Expected End:  12/31/25       Patient will be independent with HEP to improve self management.          Strength       Start:  04/28/25    Expected End:  12/31/25       Patient will improve R LE strength to 5/5 to increase function.          Pain       Start:  04/28/25    Expected End:  12/31/25       Patient will decrease pain to 1/10 to improve function.

## 2025-05-08 ENCOUNTER — TREATMENT (OUTPATIENT)
Dept: PHYSICAL THERAPY | Facility: CLINIC | Age: 30
End: 2025-05-08
Payer: COMMERCIAL

## 2025-05-08 DIAGNOSIS — S83.91XA SPRAIN OF UNSPECIFIED SITE OF RIGHT KNEE, INITIAL ENCOUNTER: ICD-10-CM

## 2025-05-08 DIAGNOSIS — S93.401A SPRAIN OF UNSPECIFIED LIGAMENT OF RIGHT ANKLE, INITIAL ENCOUNTER: ICD-10-CM

## 2025-05-08 PROCEDURE — 97110 THERAPEUTIC EXERCISES: CPT | Mod: GP

## 2025-05-08 ASSESSMENT — PAIN DESCRIPTION - DESCRIPTORS: DESCRIPTORS: ACHING;SHARP

## 2025-05-08 ASSESSMENT — PAIN - FUNCTIONAL ASSESSMENT: PAIN_FUNCTIONAL_ASSESSMENT: 0-10

## 2025-05-08 ASSESSMENT — PAIN SCALES - GENERAL: PAINLEVEL_OUTOF10: 4

## 2025-05-08 NOTE — PROGRESS NOTES
"Physical Therapy          Physical Therapy Treatment          Patient Name: Trevor Hernandez     MRN: [id number]     : 1995      Benita Sylvester     Today's Date: 2025     Time Calculation  Start Time: 911  Stop Time: 953  Time Calculation (min): 42 min     PT Therapeutic Procedures Time Entry  Therapeutic Exercise Time Entry: 40                       General     Reason for Referral: R ankle and knee pain  Referred By: GLENN Sylvester  Past Medical History Relevant to Rehab: No R LE surgeries  General Comment: visit #4     Visit #4           Current Problem     Problem List Items Addressed This Visit    None  Visit Diagnoses         Codes      Sprain of unspecified ligament of right ankle, initial encounter     S93.401A      Sprain of unspecified site of right knee, initial encounter     S83.91XA                             Subjective      Pt states \"My knee has been sore for a couple days.\" Pt denies specific incident or movement that causes knee pain.       Pt.'s name and  were confirmed this date.          Pt. Reports compliance with HEP.          Precautions     Precautions  STEADI Fall Risk Score (The score of 4 or more indicates an increased risk of falling): 0  Precautions Comment: None          Pain     Pain Assessment: 0-10  0-10 (Numeric) Pain Score: 4  Pain Type: Acute pain  Pain Location: Knee  Pain Orientation: Right  Pain Descriptors: Aching, Sharp  Pain Frequency: Constant/continuous          Objective        Pt's name and  were confirmed today.          Treatments:     Therapeutic exercise   Nu step 5'  Slant board stretch 60\" x 2 (P)  Step ups 4\" R x10 (N)  Standing 3 way hip 2x10   Heel Raises 2x10  4 way ankle green 2x15  Ankle ABC's x1 cycle (N)   Traction c DF 3x20  Traction c circles 2x10 each direction  Seated foot scoot c 5\" hold x15  Wall lean tib. Raise 2x20        Manual    Traction 2x60\"         PATIENT EDUCATION:   Outpatient Education  Individual(s) Educated: " Patient  Education Provided: Anatomy, Home Exercise Program, POC  Risk and Benefits Discussed with Patient/Caregiver/Other: yes  Patient/Caregiver Demonstrated Understanding: yes  Plan of Care Discussed and Agreed Upon: yes  Patient Response to Education: Patient/Caregiver Verbalized Understanding of Information  Education Comment: HEP, POC, anatomy          Assessment:   Pt denies pain in knee throughout. Pt tolerates activities well as long as the knee does not have to bear weight through a large ROM.              Plan:   Use caution when loading the knee, due to pain.     OP PT Plan  Treatment/Interventions: Cryotherapy, Aquatic therapy, Gait training, Hot pack, Manual therapy, Neuromuscular re-education, Therapeutic activities, Therapeutic exercises  PT Plan: Skilled PT  PT Frequency: 2 times per week  Duration: 3 weeks or as needed  Onset Date: 04/28/25  Rehab Potential: Good          Goals:     Active       Goals       HEP       Start:  04/28/25    Expected End:  12/31/25       Patient will be independent with HEP to improve self management.          Strength       Start:  04/28/25    Expected End:  12/31/25       Patient will improve R LE strength to 5/5 to increase function.          Pain       Start:  04/28/25    Expected End:  12/31/25       Patient will decrease pain to 1/10 to improve function.

## 2025-05-13 ENCOUNTER — TREATMENT (OUTPATIENT)
Dept: PHYSICAL THERAPY | Facility: CLINIC | Age: 30
End: 2025-05-13
Payer: COMMERCIAL

## 2025-05-13 DIAGNOSIS — S93.401A SPRAIN OF UNSPECIFIED LIGAMENT OF RIGHT ANKLE, INITIAL ENCOUNTER: ICD-10-CM

## 2025-05-13 DIAGNOSIS — S83.91XA SPRAIN OF UNSPECIFIED SITE OF RIGHT KNEE, INITIAL ENCOUNTER: ICD-10-CM

## 2025-05-13 PROCEDURE — 97110 THERAPEUTIC EXERCISES: CPT | Mod: GP | Performed by: PHYSICAL THERAPIST

## 2025-05-13 NOTE — PROGRESS NOTES
Physical Therapy    Physical Therapy Treatment    Patient Name: Trevor Hernandez  MRN: [id number]  Today's Date: 5/13/2025    Time Entry:   Time Calculation  Start Time: 1130  Stop Time: 1155  Time Calculation (min): 25 min     PT Therapeutic Procedures Time Entry  Therapeutic Exercise Time Entry: 24    Assessment:  Good tolerance to all exercises. Improving symptoms and decreased instability. No adverse reactions to treatment. Will continue to need PT.     Plan:  Continue PT as planned     Current Problem  1. Sprain of unspecified ligament of right ankle, initial encounter  Follow Up In Physical Therapy      2. Sprain of unspecified site of right knee, initial encounter  Follow Up In Physical Therapy        Subjective    Patient reports that PT has been helpful. He feels that he is getting stronger. He still feels some instability at his R knee, but reports that this is getting better as well. He is feeling better today than last week. Denies any pain in his R knee and R ankle.     Objective   Treatments:  Therapeutic exercise   Nu step 5 min   Slant board stretch 2 x 1 min   Step ups 6 inch R 2 x 10  Standing 3 way hip 2x10 orange   Resisted side step 3 laps orange   Monster walks 3 laps orange   Heel Raises x 20 inversion/eversion   Mini squats on foam 2 x 10   SLS on R on foam 2 x 30 sec   Bridges 2 x 10  Prone hip extension x 20 R   Seated foot scoot 40 feet   SLS on hard surface with ball x 20 forward     OP EDUCATION:  Exercise technique, progressions, taking breaks     Goals:  Active       Goals       HEP       Start:  04/28/25    Expected End:  12/31/25       Patient will be independent with HEP to improve self management.          Strength       Start:  04/28/25    Expected End:  12/31/25       Patient will improve R LE strength to 5/5 to increase function.          Pain       Start:  04/28/25    Expected End:  12/31/25       Patient will decrease pain to 1/10 to improve function.

## 2025-05-16 ENCOUNTER — TREATMENT (OUTPATIENT)
Dept: PHYSICAL THERAPY | Facility: CLINIC | Age: 30
End: 2025-05-16
Payer: COMMERCIAL

## 2025-05-16 DIAGNOSIS — S83.91XA SPRAIN OF UNSPECIFIED SITE OF RIGHT KNEE, INITIAL ENCOUNTER: ICD-10-CM

## 2025-05-16 DIAGNOSIS — S93.401A SPRAIN OF UNSPECIFIED LIGAMENT OF RIGHT ANKLE, INITIAL ENCOUNTER: ICD-10-CM

## 2025-05-16 PROCEDURE — 97110 THERAPEUTIC EXERCISES: CPT | Mod: GP,CQ

## 2025-05-16 ASSESSMENT — PAIN DESCRIPTION - DESCRIPTORS: DESCRIPTORS: ACHING

## 2025-05-16 ASSESSMENT — PAIN - FUNCTIONAL ASSESSMENT: PAIN_FUNCTIONAL_ASSESSMENT: 0-10

## 2025-05-16 ASSESSMENT — PAIN SCALES - GENERAL
PAINLEVEL_OUTOF10: 1
PAINLEVEL_OUTOF10: 4

## 2025-05-16 NOTE — PROGRESS NOTES
Physical Therapy Treatment    Patient Name: Trevor Hernandez  MRN: [id number]  : 1995  Benita Sylvester  Today's Date: 2025  Time Calculation  Start Time: 1401  Stop Time: 1445  Time Calculation (min): 44 min  PT Therapeutic Procedures Time Entry  Therapeutic Exercise Time Entry: 42         Assessment:   Patient identified by name & .  Treatment consisted of ex's for right knee and ankle strengthening and stability. Patient able to complete ex's with minimal difficulty. Did have to review proper squatting form several times to not increase pressure on knees. No change in right knee pain pre and post treatment and decreased right ankle pain to a 3/10 post treatment.     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 -MA   OP PT Plan  Treatment/Interventions: Cryotherapy, Aquatic therapy, Gait training, Hot pack, Manual therapy, Neuromuscular re-education, Therapeutic activities, Therapeutic exercises  PT Plan: Skilled PT  PT Frequency: 2 times per week  Duration: 3 weeks or as needed  Onset Date: 25  Rehab Potential: Good    Current Problem  Problem List Items Addressed This Visit    None  Visit Diagnoses         Codes      Sprain of unspecified ligament of right ankle, initial encounter     S93.401A      Sprain of unspecified site of right knee, initial encounter     S83.91XA            Subjective   Patient compliant with HEP. Reports that his right knee pain was more sore before the last few visits, so the treatment was done with modifications for that increased right knee pain. Reports the knee is feeling better today.   General  Reason for Referral: R ankle and knee pain  Referred By: GLENN Sylvester  Past Medical History Relevant to Rehab: No R LE surgeries  General Comment: visit #5  Visit #6  Precautions  Precautions  Precautions Comment: None    Pain  Pain Assessment: 0-10  0-10 (Numeric)  "Pain Score: 1  Pain Type: Acute pain  Pain Location: Knee  Pain Orientation: Right  Pain Descriptors: Aching  Pain Frequency: Constant/continuous  Multiple Pain Sites: Two    Objective:   No UE assist needed during R LE SLS.   Treatments:  Therapeutic exercise  x42'  Nu step Stepper, seat 15, arms 5 x5 min   Slant board stretch 2 x 1 min   Step ups 6 inch R 2 x 10  R lateral step ups, 6\" step x10 (N)  Standing 3 way hip 2x10 orange   Resisted side step 3 laps orange   Monster walks 3 laps orange   Heel Raises x 20 inversion/eversion   Mini squats on foam 2 x 10   SLS on R on foam 2 x 30 sec, no UE assist   Bridges 2 x 10  Prone hip extension x 20 R   Seated foot scoot 40 feet (stool scoot)  SLS on hard surface with 4# ball toss into mini tramp x 20 forward       OP EDUCATION:  Outpatient Education  Individual(s) Educated: Patient  Education Provided: Anatomy, Home Exercise Program, POC  Risk and Benefits Discussed with Patient/Caregiver/Other: yes  Patient/Caregiver Demonstrated Understanding: yes  Plan of Care Discussed and Agreed Upon: yes  Patient Response to Education: Patient/Caregiver Verbalized Understanding of Information  Education Comment: HEP, POC, anatomyAccess Code: [email or web address]  URL: [email or web address]  Date: 04/30/2025  Prepared by: Estela Garrison  Exercises  - Standing Hip Abduction with Counter Support  - 1 x daily - 7 x weekly - 1-3 sets - 10 reps  - Standing Hip Extension with Counter Support  - 1 x daily - 7 x weekly - 1-3 sets - 10 reps  - Standing Hip Flexion AROM  - 1 x daily - 7 x weekly - 1-3 sets - 10 reps  - Single Leg Stance with Support  - 1 x daily - 7 x weekly - 1-3 sets - 10 reps  - Standing Heel Raise with Support  - 1 x daily - 7 x weekly - 1-3 sets - 10 reps  - Mini Squat  - 1 x daily - 7 x weekly - 1-3 sets - 10 reps    Goals:  Active       Goals       HEP       Start:  04/28/25    Expected End:  12/31/25       Patient will be independent with HEP to " improve self management.          Strength       Start:  04/28/25    Expected End:  12/31/25       Patient will improve R LE strength to 5/5 to increase function.          Pain       Start:  04/28/25    Expected End:  12/31/25       Patient will decrease pain to 1/10 to improve function.

## 2025-05-20 ENCOUNTER — TREATMENT (OUTPATIENT)
Dept: PHYSICAL THERAPY | Facility: CLINIC | Age: 30
End: 2025-05-20
Payer: COMMERCIAL

## 2025-05-20 DIAGNOSIS — S93.401A SPRAIN OF UNSPECIFIED LIGAMENT OF RIGHT ANKLE, INITIAL ENCOUNTER: ICD-10-CM

## 2025-05-20 DIAGNOSIS — S83.91XA SPRAIN OF UNSPECIFIED SITE OF RIGHT KNEE, INITIAL ENCOUNTER: ICD-10-CM

## 2025-05-20 PROCEDURE — 97110 THERAPEUTIC EXERCISES: CPT | Mod: GP

## 2025-05-20 ASSESSMENT — PAIN DESCRIPTION - DESCRIPTORS: DESCRIPTORS: ACHING

## 2025-05-20 ASSESSMENT — PAIN - FUNCTIONAL ASSESSMENT: PAIN_FUNCTIONAL_ASSESSMENT: 0-10

## 2025-05-20 ASSESSMENT — PAIN SCALES - GENERAL: PAINLEVEL_OUTOF10: 3

## 2025-05-20 NOTE — PROGRESS NOTES
"Physical Therapy          Physical Therapy Treatment          Patient Name: Trevor Hernandez     MRN: [id number]     : 1995      Benita Sylvester     Today's Date: 2025     Time Calculation  Start Time: 742  Stop Time: 822  Time Calculation (min): 40 min                             General     Reason for Referral: R ankle and knee pain  Referred By: GLENN Sylvester  Past Medical History Relevant to Rehab: No R LE surgeries  General Comment: visit #6     Visit #7           Current Problem     Problem List Items Addressed This Visit    None  Visit Diagnoses         Codes      Sprain of unspecified ligament of right ankle, initial encounter     S93.401A      Sprain of unspecified site of right knee, initial encounter     S83.91XA                             Subjective      Pt reports that his ankle/foot feel \"fine\" but his knee is painful.       Pt.'s name and  were confirmed this date.          Pt. Reports compliance with HEP.          Precautions     Precautions  Precautions Comment: none          Pain     Pain Assessment: 0-10  0-10 (Numeric) Pain Score: 3  Pain Type: Acute pain  Pain Location: Knee  Pain Orientation: Right  Pain Descriptors: Aching  Pain Frequency: Constant/continuous          Objective       Lower Extremity ROM:?   ?  RIGHT?  LEFT?    Hip Abduction?  ?  ?    Hip ER?  ?  ?    Hip IR?  ?  ?    Hip Flexion?  ??  ??    Hip Ext.?  ??  ??    ?Hip Add.?  ??  ??    ?Knee Flexion?  ??  ??    ?Knee Ext.??  ??  ??    Dorsiflexion?  ? 19 ?    Plantar Flexion?  ?  ?    Eversion?  ?  ?    Inversion?  ? 22 ?    ?  ?  ?      Lower Extremity Strength:?   MMT 5/5 max??  RIGHT?  LEFT?    Hip Abduction?  ?  ?    Hip ER?  ?  ?    Hip IR?  ?  ?    Hip Flexion?  ??  ??    Hip Ext.?  ??  ??    ?Hip Add.?  ??  ??    ?Knee Flexion?  ??  ??    ?Knee Ext.??  ??  ??    Dorsiflexion?  ? 4+ ?    Plantar Flexion?  ? 5 ?    Eversion?  ? 4+ ?    Inversion?  ? 4+ ?    ?  ?  ?             Pt's name and  were confirmed " "today.          Outcome Measures:     Other Measures  Lower Extremity Funtional Score (LEFS): 65          Treatments:     Therapeutic exercise   Nu step Stepper, seat 15, arms 5 x5 min   Standing 3 way hip 2x10 orange   Resisted side step 3 laps orange   Heel Raises x 20 inversion/eversion   SLS on R on foam 2 x 30 sec, no UE assist   Seated foot scoot c towel 10x5\"  BAPS (PF/DF, circles, INV/EV) x10 each  Banded traction 3x60\"  DF c traction 2x20          PATIENT EDUCATION:   Outpatient Education  Individual(s) Educated: Patient  Education Provided: Anatomy, Home Exercise Program, POC  Risk and Benefits Discussed with Patient/Caregiver/Other: yes  Patient/Caregiver Demonstrated Understanding: yes  Plan of Care Discussed and Agreed Upon: yes  Patient Response to Education: Patient/Caregiver Verbalized Understanding of Information  Education Comment: HEP, POC, anatomy          Assessment:   Pt has made some progress towards goals, including increased strength, increased AROM, decreased pain, and is still functioning below baseline and could benefit from continued PT services.              Plan:     OP PT Plan  Treatment/Interventions: Cryotherapy, Aquatic therapy, Gait training, Hot pack, Manual therapy, Neuromuscular re-education, Therapeutic activities, Therapeutic exercises  PT Plan: Skilled PT  PT Frequency: 2 times per week  Duration: 3 weeks or as needed  Onset Date: 04/28/25  Rehab Potential: Good          Goals:     Active       Goals       HEP       Start:  04/28/25    Expected End:  12/31/25       Patient will be independent with HEP to improve self management.          Strength       Start:  04/28/25    Expected End:  12/31/25       Patient will improve R LE strength to 5/5 to increase function.          Pain       Start:  04/28/25    Expected End:  12/31/25       Patient will decrease pain to 1/10 to improve function.               "

## 2025-05-28 ENCOUNTER — TREATMENT (OUTPATIENT)
Dept: PHYSICAL THERAPY | Facility: CLINIC | Age: 30
End: 2025-05-28
Payer: COMMERCIAL

## 2025-05-28 DIAGNOSIS — S83.91XA SPRAIN OF UNSPECIFIED SITE OF RIGHT KNEE, INITIAL ENCOUNTER: ICD-10-CM

## 2025-05-28 DIAGNOSIS — S93.401A SPRAIN OF UNSPECIFIED LIGAMENT OF RIGHT ANKLE, INITIAL ENCOUNTER: ICD-10-CM

## 2025-05-28 PROCEDURE — 97110 THERAPEUTIC EXERCISES: CPT | Mod: GP

## 2025-05-28 ASSESSMENT — PAIN - FUNCTIONAL ASSESSMENT: PAIN_FUNCTIONAL_ASSESSMENT: 0-10

## 2025-05-28 ASSESSMENT — PAIN DESCRIPTION - DESCRIPTORS: DESCRIPTORS: ACHING

## 2025-05-28 ASSESSMENT — PAIN SCALES - GENERAL: PAINLEVEL_OUTOF10: 3

## 2025-05-28 NOTE — PROGRESS NOTES
"Physical Therapy          Physical Therapy Treatment          Patient Name: Trevor Hernandez     MRN: [id number]     : 1995      Benita Sylvester     Today's Date: 2025     Time Calculation  Start Time: 827  Stop Time: 915  Time Calculation (min): 48 min     PT Therapeutic Procedures Time Entry  Therapeutic Exercise Time Entry: 48                       General     Reason for Referral: R ankle and knee pain  Referred By: GLENN Sylvester  Past Medical History Relevant to Rehab: No R LE surgeries  General Comment: visit #7     Visit #8           Current Problem     Problem List Items Addressed This Visit    None  Visit Diagnoses         Codes      Sprain of unspecified ligament of right ankle, initial encounter     S93.401A      Sprain of unspecified site of right knee, initial encounter     S83.91XA                             Subjective      Pt states \"I was feeling pretty good until I went to the [employer] and was walking around. Now it's sore.\"      Pt.'s name and  were confirmed this date.          Pt. Reports compliance with HEP.          Precautions     Precautions  Precautions Comment: None          Pain     Pain Assessment: 0-10  0-10 (Numeric) Pain Score: 3  Pain Type: Acute pain  Pain Location: Knee  Pain Orientation: Right  Pain Descriptors: Aching  Pain Frequency: Constant/continuous          Objective                      Pt's name and  were confirmed today.       Treatments:     Therapeutic exercise   Nu step Stepper, seat 15, arms 5 x5 min   Standing 3 way hip 2x10 orange   Resisted side step 3 laps orange    SLS on R on foam 10x10\" sec, no UE assist   Seated foot scoot c towel 10x5\"  BAPS (PF/DF, circles, INV/EV) x15 each  Wall tib. raises 2x20  Banded traction 3x60\"  DF c traction 2x20  OTB side-stepping 20'x2 each   Cone tap SL RDL x10         PATIENT EDUCATION:   Outpatient Education  Individual(s) Educated: Patient  Education Provided: Anatomy, Home Exercise Program, " POC  Risk and Benefits Discussed with Patient/Caregiver/Other: yes  Patient/Caregiver Demonstrated Understanding: yes  Plan of Care Discussed and Agreed Upon: yes  Patient Response to Education: Patient/Caregiver Verbalized Understanding of Information  Education Comment: HEP, POC, anatomy          Assessment:   Pt denies increased pain at conclusion. Pt reports some discomfort c SLS, but it subsides quickly.         Plan:     OP PT Plan  Treatment/Interventions: Cryotherapy, Aquatic therapy, Gait training, Hot pack, Manual therapy, Neuromuscular re-education, Therapeutic activities, Therapeutic exercises  PT Plan: Skilled PT  PT Frequency: 2 times per week (1-2x/week)  Duration: 3 weeks or as needed  Onset Date: 04/28/25  Rehab Potential: Good          Goals:     Active       Goals       HEP (Met)       Start:  04/28/25    Expected End:  12/31/25    Resolved:  05/20/25    Patient will be independent with HEP to improve self management.          Strength (Progressing)       Start:  04/28/25    Expected End:  12/31/25       Patient will improve R LE strength to 5/5 to increase function.          Pain (Progressing)       Start:  04/28/25    Expected End:  12/31/25       Patient will decrease pain to 1/10 to improve function.

## 2025-05-30 ENCOUNTER — TREATMENT (OUTPATIENT)
Dept: PHYSICAL THERAPY | Facility: CLINIC | Age: 30
End: 2025-05-30
Payer: COMMERCIAL

## 2025-05-30 DIAGNOSIS — S93.401A SPRAIN OF UNSPECIFIED LIGAMENT OF RIGHT ANKLE, INITIAL ENCOUNTER: ICD-10-CM

## 2025-05-30 DIAGNOSIS — S83.91XA SPRAIN OF UNSPECIFIED SITE OF RIGHT KNEE, INITIAL ENCOUNTER: ICD-10-CM

## 2025-05-30 PROCEDURE — 97140 MANUAL THERAPY 1/> REGIONS: CPT | Mod: GP,CQ | Performed by: PHYSICAL THERAPY ASSISTANT

## 2025-05-30 PROCEDURE — 97110 THERAPEUTIC EXERCISES: CPT | Mod: GP,CQ | Performed by: PHYSICAL THERAPY ASSISTANT

## 2025-05-30 ASSESSMENT — PAIN - FUNCTIONAL ASSESSMENT: PAIN_FUNCTIONAL_ASSESSMENT: 0-10

## 2025-05-30 ASSESSMENT — PAIN SCALES - GENERAL: PAINLEVEL_OUTOF10: 3

## 2025-05-30 ASSESSMENT — PAIN DESCRIPTION - DESCRIPTORS: DESCRIPTORS: ACHING

## 2025-05-30 NOTE — PROGRESS NOTES
"Physical Therapy    Physical Therapy Treatment    Patient Name: Trevor Hernandez  MRN: [id number]  Today's Date: 2025  Time Calculation  Start Time: 0900  Stop Time: 0945  Time Calculation (min): 45 min  PT Therapeutic Procedures Time Entry  Manual Therapy Time Entry: 10  Therapeutic Exercise Time Entry: 30                   Current Problem  Problem List Items Addressed This Visit    None  Visit Diagnoses         Codes      Sprain of unspecified ligament of right ankle, initial encounter     S93.401A      Sprain of unspecified site of right knee, initial encounter     S83.91XA             General  Reason for Referral: R ankle and knee pain  Referred By: GLENN Sylvester  Past Medical History Relevant to Rehab: No R LE surgeries  General Comment: Visit # 8    Subjective   Patient reports no falls since last visit.  Patient reports 3/10 right knee pain and 1/10 right ankle pain.    Precautions  Precautions  Precautions Comment: None    Pain  Pain Assessment: 0-10  0-10 (Numeric) Pain Score: 3  Pain Type: Acute pain  Pain Location: Knee  Pain Orientation: Right  Pain Descriptors: Aching  Pain Frequency: Constant/continuous    Objective   Nu step Stepper, seat 15, arms 5 x5 min   Standing 3 way hip 2x10 orange   Resisted side step 3 laps orange    SLS on R on foam 10x10\" sec, no UE assist   Seated foot scoot c towel 10x5\"  BAPS (PF/DF, circles, INV/EV) x15 each  Wall tib. raises 2x20  Banded traction 3x60\"  Manual  DF c traction 2x20  Manual  OTB side-stepping 20'x2 each   Cone tap SL RDL x10         Treatments:    OP Education      Assessment  Patient tolerated treatment without increased knee/ankle pain.  Patient with improved balance/stability per patient.  Continue with right ankle/knee strength/ROM to decrease pain, improve balance/ambulation to decrease fall risk.  Patient verified by name and .    Plan   Continue with right ankle/knee strength/ROM to improve standing, ambulation/steps.  Treatment/Interventions: " Cryotherapy, Aquatic therapy, Gait training, Hot pack, Manual therapy, Neuromuscular re-education, Therapeutic activities, Therapeutic exercises  PT Plan: Skilled PT  PT Frequency: 2 times per week (1-2x/week)  Duration: 3 weeks or as needed  Onset Date: 04/28/25  Rehab Potential: Good    Active       Goals       HEP (Met)       Start:  04/28/25    Expected End:  12/31/25    Resolved:  05/20/25    Patient will be independent with HEP to improve self management.          Strength (Progressing)       Start:  04/28/25    Expected End:  12/31/25       Patient will improve R LE strength to 5/5 to increase function.          Pain (Progressing)       Start:  04/28/25    Expected End:  12/31/25       Patient will decrease pain to 1/10 to improve function.

## 2025-06-02 ENCOUNTER — TREATMENT (OUTPATIENT)
Dept: PHYSICAL THERAPY | Facility: CLINIC | Age: 30
End: 2025-06-02
Payer: COMMERCIAL

## 2025-06-02 DIAGNOSIS — S93.401A SPRAIN OF UNSPECIFIED LIGAMENT OF RIGHT ANKLE, INITIAL ENCOUNTER: ICD-10-CM

## 2025-06-02 DIAGNOSIS — S83.91XA SPRAIN OF UNSPECIFIED SITE OF RIGHT KNEE, INITIAL ENCOUNTER: ICD-10-CM

## 2025-06-02 PROCEDURE — 97110 THERAPEUTIC EXERCISES: CPT | Mod: GP,CQ

## 2025-06-02 ASSESSMENT — PAIN DESCRIPTION - DESCRIPTORS
DESCRIPTORS: ACHING
DESCRIPTORS: ACHING

## 2025-06-02 ASSESSMENT — PAIN - FUNCTIONAL ASSESSMENT: PAIN_FUNCTIONAL_ASSESSMENT: 0-10

## 2025-06-02 ASSESSMENT — PAIN SCALES - GENERAL
PAINLEVEL_OUTOF10: 4
PAINLEVEL_OUTOF10: 3

## 2025-06-02 NOTE — PROGRESS NOTES
"Physical Therapy Treatment    Patient Name: Trevor Hernandez  MRN: [id number]  Today's Date: 6/2/2025  Time Calculation  Start Time: 0915  Stop Time: 0956  Time Calculation (min): 41 min  PT Therapeutic Procedures Time Entry  Therapeutic Exercise Time Entry: 39         Current Problem  Problem List Items Addressed This Visit    None  Visit Diagnoses         Codes      Sprain of unspecified ligament of right ankle, initial encounter     S93.401A      Sprain of unspecified site of right knee, initial encounter     S83.91XA            Subjective   Pt.'s name and birthday confirmed.  Pt. Is compliant with HEP.  Pt. Reports right knee and ankle pain this am.  Pt. States he did not do anything differently over the weekend.  No c/o recent falls.    Reason for Referral: R ankle and knee pain  Referred By: GLENN Sylvester  Past Medical History Relevant to Rehab: No R LE surgeries  General Comment: Visit # 9      Precautions  Precautions  Precautions Comment: None      Pain  Pain Assessment: 0-10  0-10 (Numeric) Pain Score: 3  Pain Type: Acute pain  Pain Location: Ankle  Pain Orientation: Right  Pain Descriptors: Aching    Objective:  No UE support needed with SLS    Treatments:  Nu step Stepper, seat 15, arms 5 x5 min   Standing 3 way hip 2x10 orange   Resisted side step 3 laps orange    Seated foot scoot c towel 10x5\"  BAPS (PF/DF, circles, INV/EV) x20 ea (P)  Wall tib. raises 2x20  Cone tap SL RDL x10   Slant board stretch 2 x 1 min   Step ups 6 inch R 2 x 10  R lateral step ups, 6\" step 2 x10 (P)  Monster walks 3 laps orange   Heel Raises x 20 inversion/eversion   Mini squats on foam 2 x 10   SLS on R on foam 2 x 30 sec, no UE assist   Bridges 2 x 10  Prone hip extension x 20 R            OP EDUCATION:  Access Code: [email or web address]  URL: [email or web address]  Date: 04/30/2025  Prepared by: Estela Garrison  Exercises  - Standing Hip Abduction with Counter Support  - 1 x daily - 7 x weekly - 1-3 sets - 10 reps  - " Standing Hip Extension with Counter Support  - 1 x daily - 7 x weekly - 1-3 sets - 10 reps  - Standing Hip Flexion AROM  - 1 x daily - 7 x weekly - 1-3 sets - 10 reps  - Single Leg Stance with Support  - 1 x daily - 7 x weekly - 1-3 sets - 10 reps  - Standing Heel Raise with Support  - 1 x daily - 7 x weekly - 1-3 sets - 10 reps  - Mini Squat  - 1 x daily - 7 x weekly - 1-3 sets - 10 reps              Assessment:  Pt. With improved tolerance with TE this date, no c/o increased pain noted.  Pt. Notes soreness with knee with squats.  Verbal cues needed with hip 3-ways.         Plan:  Continue with POC  and progress as able to improve ambulation and balance.  MB         OP PT Plan  Treatment/Interventions: Cryotherapy, Aquatic therapy, Gait training, Hot pack, Manual therapy, Neuromuscular re-education, Therapeutic activities, Therapeutic exercises  PT Plan: Skilled PT  PT Frequency: 2 times per week (1-2x/week)  Duration: 3 weeks or as needed  Onset Date: 04/28/25  Rehab Potential: Good              Goals:  Active       Goals       HEP (Met)       Start:  04/28/25    Expected End:  12/31/25    Resolved:  05/20/25    Patient will be independent with HEP to improve self management.          Strength (Progressing)       Start:  04/28/25    Expected End:  12/31/25       Patient will improve R LE strength to 5/5 to increase function.          Pain (Progressing)       Start:  04/28/25    Expected End:  12/31/25       Patient will decrease pain to 1/10 to improve function.

## 2025-06-05 ENCOUNTER — TREATMENT (OUTPATIENT)
Dept: PHYSICAL THERAPY | Facility: CLINIC | Age: 30
End: 2025-06-05
Payer: COMMERCIAL

## 2025-06-05 DIAGNOSIS — S93.401A SPRAIN OF UNSPECIFIED LIGAMENT OF RIGHT ANKLE, INITIAL ENCOUNTER: ICD-10-CM

## 2025-06-05 DIAGNOSIS — S83.91XA SPRAIN OF UNSPECIFIED SITE OF RIGHT KNEE, INITIAL ENCOUNTER: ICD-10-CM

## 2025-06-05 PROCEDURE — 97110 THERAPEUTIC EXERCISES: CPT | Mod: GP,CQ

## 2025-06-05 ASSESSMENT — PAIN SCALES - GENERAL
PAINLEVEL_OUTOF10: 4
PAINLEVEL_OUTOF10: 3

## 2025-06-05 ASSESSMENT — PAIN - FUNCTIONAL ASSESSMENT: PAIN_FUNCTIONAL_ASSESSMENT: 0-10

## 2025-06-05 NOTE — PROGRESS NOTES
Physical Therapy Treatment    Patient Name: Trevor Hernandez  MRN: [id number]  Today's Date: 6/5/2025  Time Calculation  Start Time: 0915  Stop Time: 0956  Time Calculation (min): 41 min  PT Therapeutic Procedures Time Entry  Therapeutic Exercise Time Entry: 39       Assessment:   Able to incorporate ankle traction this date with no adverse reactions, and improvements in symptoms noted after traction. Patient doe's good form throughout session. Brace doffed throughout session with WB exercises and no c/o increased symptoms. Doe's fatigue at end of session.     Plan:  Continue to work on improving strength and decreasing pain to be able to tolerate normal work duties with little to no difficulty. -AB.     OP PT Plan  Treatment/Interventions: Cryotherapy, Aquatic therapy, Gait training, Hot pack, Manual therapy, Neuromuscular re-education, Therapeutic activities, Therapeutic exercises  PT Plan: Skilled PT  PT Frequency: 2 times per week (1-2x/week)  Duration: 3 weeks or as needed  Onset Date: 04/28/25  Rehab Potential: Good    Current Problem  Problem List Items Addressed This Visit    None  Visit Diagnoses         Codes      Sprain of unspecified ligament of right ankle, initial encounter     S93.401A      Sprain of unspecified site of right knee, initial encounter     S83.91XA            Subjective   General  Reason for Referral: R ankle and knee pain  Referred By: GLENN Sylvester  Past Medical History Relevant to Rehab: No R LE surgeries  General Comment: Visit # 10  Visit: 11  HEP: Yes  Patient states that he is compliant with his HEP. States that he thinks he's sore because he did his exercises in the late evening and then he was on it all day yesterday.     Precautions  Precautions  Precautions Comment: None    Pain  Pain Assessment: 0-10  0-10 (Numeric) Pain Score: 3  Pain Type: Acute pain  Pain Location: Ankle  Pain Orientation: Right    Objective     Treatments:  Therapeutic Exercise: 39 minutes, 3 units  Recumbent  "bike x5' (N)  Slant board stretch 2 x 1 min   Step ups 6 inch R 2 x 10  R lateral step ups, 6\" step 2 x10 (P)  Nu step Stepper, seat 15, arms 5 x5 min (X)  Ankle traction x5' (N)  Standing 3 way hip 2x10 yellow loop band Bilat   Resisted side step 3 laps yellow loop band     Seated foot scoot c towel 10x5\" (X)  BAPS (PF/DF, circles, INV/EV) x20 ea (X)  Wall tib. raises 2x20  Cone tap SL RDL x10 (X)  Monster walks 3 laps orange (X)  Heel Raises x 20 inversion/eversion   Mini squats on foam 2 x 10 (X)  SLS on R on foam 2 x 30 sec, no UE assist (X)  Bridges 2 x 10 (X)  Prone hip extension x 20 R (X)           OP EDUCATION:  Access Code: [email or web address]  URL: [email or web address]  Date: 04/30/2025  Prepared by: Estela Garrison  Exercises  - Standing Hip Abduction with Counter Support  - 1 x daily - 7 x weekly - 1-3 sets - 10 reps  - Standing Hip Extension with Counter Support  - 1 x daily - 7 x weekly - 1-3 sets - 10 reps  - Standing Hip Flexion AROM  - 1 x daily - 7 x weekly - 1-3 sets - 10 reps  - Single Leg Stance with Support  - 1 x daily - 7 x weekly - 1-3 sets - 10 reps  - Standing Heel Raise with Support  - 1 x daily - 7 x weekly - 1-3 sets - 10 reps  - Mini Squat  - 1 x daily - 7 x weekly - 1-3 sets - 10 reps      Goals:  Active       Goals       HEP (Met)       Start:  04/28/25    Expected End:  12/31/25    Resolved:  05/20/25    Patient will be independent with HEP to improve self management.          Strength (Progressing)       Start:  04/28/25    Expected End:  12/31/25       Patient will improve R LE strength to 5/5 to increase function.          Pain (Progressing)       Start:  04/28/25    Expected End:  12/31/25       Patient will decrease pain to 1/10 to improve function.             "

## 2025-06-10 ENCOUNTER — TREATMENT (OUTPATIENT)
Dept: PHYSICAL THERAPY | Facility: CLINIC | Age: 30
End: 2025-06-10
Payer: COMMERCIAL

## 2025-06-10 DIAGNOSIS — S83.91XA SPRAIN OF UNSPECIFIED SITE OF RIGHT KNEE, INITIAL ENCOUNTER: ICD-10-CM

## 2025-06-10 DIAGNOSIS — S93.401A SPRAIN OF UNSPECIFIED LIGAMENT OF RIGHT ANKLE, INITIAL ENCOUNTER: ICD-10-CM

## 2025-06-10 PROCEDURE — 97110 THERAPEUTIC EXERCISES: CPT | Mod: GP

## 2025-06-10 ASSESSMENT — PAIN - FUNCTIONAL ASSESSMENT: PAIN_FUNCTIONAL_ASSESSMENT: 0-10

## 2025-06-10 ASSESSMENT — PAIN SCALES - GENERAL: PAINLEVEL_OUTOF10: 0 - NO PAIN

## 2025-06-10 NOTE — PROGRESS NOTES
Physical Therapy          Physical Therapy Treatment          Patient Name: Trevor Hernandez     MRN: [id number]     : 1995      Benita Sylvester     Today's Date: 6/10/2025     Time Calculation  Start Time: 742  Stop Time: 822  Time Calculation (min): 40 min     PT Therapeutic Procedures Time Entry  Therapeutic Exercise Time Entry: 40                       General     Reason for Referral: R ankle and knee pain  Referred By: GLENN Sylvester  Past Medical History Relevant to Rehab: No R LE surgeries  General Comment: Visit # 11     Visit #12           Current Problem     Problem List Items Addressed This Visit           ICD-10-CM       Musculoskeletal and Injuries    Sprain of unspecified ligament of right ankle, initial encounter S93.401A    Sprain of unspecified site of right knee, initial encounter S83.91XA                 Subjective      Pt reports that he has been more active recently with little to no pain (</=1/10).    Pt.'s name and  were confirmed this date.          Pt. Reports compliance with HEP.          Precautions     Precautions  Precautions Comment: None          Pain     Pain Assessment: 0-10  0-10 (Numeric) Pain Score: 0 - No pain          Objective      Lower Extremity ROM:?   ?  RIGHT?  LEFT?    Hip Abduction?  ?  ?    Hip ER?  ?  ?    Hip IR?  ?  ?    Hip Flexion?  ??  ??    Hip Ext.?  ??  ??    ?Hip Add.?  ??  ??    ?Knee Flexion?  ??  ??    ?Knee Ext.??  ??  ??    Dorsiflexion?  ? 20 ?    Plantar Flexion?  ?  ?    Eversion?  ?  ?    Inversion?  ? 35 ?    ?  ?  ?      Lower Extremity Strength:?   MMT 5/5 max??  RIGHT?  LEFT?    Hip Abduction?  ?  ?    Hip ER?  ?  ?    Hip IR?  ?  ?    Hip Flexion?  ??  ??    Hip Ext.?  ??  ??    ?Hip Add.?  ??  ??    ?Knee Flexion?  ??  ??    ?Knee Ext.??  ??  ??    Dorsiflexion?  ? 5 ?    Plantar Flexion?  ? 5 ?    Eversion?  ? 5 ?    Inversion?  ? 5 ?    ?  ?  ?      Pt's name and  were confirmed today.          Outcome Measures:     Other  "Measures  Lower Extremity Funtional Score (LEFS): 71       Treatments:     Therapeutic exercise   Recumbent bike x5' (N)  Slant board stretch 2 x 1 min   Step ups 6 inch R 2 x 10  R lateral step ups, 6\" step 2 x10 (P)  Nu step Stepper, seat 15, arms 5 x5 min (X)  Ankle traction x5' (N)  Standing 3 way hip 2x10 yellow loop band Bilat   Resisted side step 3 laps yellow loop band     Seated foot scoot c towel 10x5\" (X)  BAPS (PF/DF, circles, INV/EV) x20 ea (X)  Wall tib. raises 2x20  Cone tap SL RDL x10 (X)  Monster walks 3 laps orange (X)  Heel Raises x 20 inversion/eversion   Mini squats on foam 2 x 10 (X)  SLS on R on foam 2 x 30 sec, no UE assist (X)  Bridges 2 x 10 (X)  Prone hip extension x 20 R (X)       PATIENT EDUCATION:   Outpatient Education  Individual(s) Educated: Patient  Education Provided: Anatomy, Home Exercise Program, POC, Body Mechanics, Physiology  Risk and Benefits Discussed with Patient/Caregiver/Other: yes  Patient/Caregiver Demonstrated Understanding: yes  Plan of Care Discussed and Agreed Upon: yes  Patient Response to Education: Patient/Caregiver Verbalized Understanding of Information          Assessment:   Pt has made good progress towards goals. Pt has increased AROM and strength, decreased pain, improved gait mechanics, improved LEFS to 71/80.              Plan:   Pt feels confident continuing c HEP and contacting physician if needed. This is to serve as the discharge summary.   OP PT Plan  Treatment/Interventions: Cryotherapy, Aquatic therapy, Gait training, Hot pack, Manual therapy, Neuromuscular re-education, Therapeutic activities, Therapeutic exercises  PT Plan: Skilled PT  PT Frequency: 2 times per week (1-2x/week)  Duration: 3 weeks or as needed  Onset Date: 04/28/25  Rehab Potential: Good          Goals:     Resolved       Goals       HEP (Met)       Start:  04/28/25    Expected End:  12/31/25    Resolved:  05/20/25    Patient will be independent with HEP to improve self " management.          Strength (Met)       Start:  04/28/25    Expected End:  12/31/25    Resolved:  06/10/25    Patient will improve R LE strength to 5/5 to increase function.          Pain (Met)       Start:  04/28/25    Expected End:  12/31/25    Resolved:  06/10/25    Patient will decrease pain to 1/10 to improve function.

## 2025-06-12 ENCOUNTER — OFFICE VISIT (OUTPATIENT)
Dept: URGENT CARE | Facility: CLINIC | Age: 30
End: 2025-06-12
Payer: COMMERCIAL

## 2025-06-12 VITALS
OXYGEN SATURATION: 97 % | HEART RATE: 115 BPM | TEMPERATURE: 97.8 F | RESPIRATION RATE: 16 BRPM | SYSTOLIC BLOOD PRESSURE: 145 MMHG | DIASTOLIC BLOOD PRESSURE: 96 MMHG

## 2025-06-12 DIAGNOSIS — M77.11 LATERAL EPICONDYLITIS OF RIGHT ELBOW: Primary | ICD-10-CM

## 2025-06-12 PROCEDURE — 96372 THER/PROPH/DIAG INJ SC/IM: CPT | Performed by: PHYSICIAN ASSISTANT

## 2025-06-12 PROCEDURE — 2500000004 HC RX 250 GENERAL PHARMACY W/ HCPCS (ALT 636 FOR OP/ED): Mod: JZ | Performed by: PHYSICIAN ASSISTANT

## 2025-06-12 PROCEDURE — 99213 OFFICE O/P EST LOW 20 MIN: CPT | Performed by: PHYSICIAN ASSISTANT

## 2025-06-12 RX ORDER — INSULIN GLARGINE 100 [IU]/ML
INJECTION, SOLUTION SUBCUTANEOUS NIGHTLY
COMMUNITY

## 2025-06-12 RX ORDER — KETOROLAC TROMETHAMINE 30 MG/ML
30 INJECTION, SOLUTION INTRAMUSCULAR; INTRAVENOUS ONCE
Status: COMPLETED | OUTPATIENT
Start: 2025-06-12 | End: 2025-06-12

## 2025-06-12 RX ORDER — INSULIN LISPRO 100 [IU]/ML
INJECTION, SOLUTION INTRAVENOUS; SUBCUTANEOUS
COMMUNITY

## 2025-06-12 RX ORDER — TRAMADOL HYDROCHLORIDE 50 MG/1
50 TABLET, FILM COATED ORAL EVERY 6 HOURS PRN
Qty: 12 TABLET | Refills: 0 | Status: SHIPPED | OUTPATIENT
Start: 2025-06-12 | End: 2025-06-19

## 2025-06-12 RX ORDER — PREDNISONE 50 MG/1
50 TABLET ORAL DAILY
Qty: 5 TABLET | Refills: 0 | Status: SHIPPED | OUTPATIENT
Start: 2025-06-12 | End: 2025-06-17

## 2025-06-12 RX ADMIN — KETOROLAC TROMETHAMINE 30 MG: 30 INJECTION, SOLUTION INTRAMUSCULAR at 10:49

## 2025-06-12 ASSESSMENT — ENCOUNTER SYMPTOMS
SHORTNESS OF BREATH: 0
FACIAL SWELLING: 0
COLOR CHANGE: 0
PALPITATIONS: 0
WOUND: 0
FEVER: 0

## 2025-06-12 NOTE — PROGRESS NOTES
[facility] URGENT CARE   HALLIE NOTE:    Name: Trevor Hernandez, 30 y.o.  male                   CSN:[id number] MRN:[id number]    PCP: No Assigned PCP Generic Provider, MD    Chief Complaint: R arm pain (Swelling x 3 days, no known injury)    PMHx:  Medical History[1]                          RX Allergies[2]                            Medication Documentation Review Audit       Reviewed by Anabel Sharp MA (Medical Assistant) on 25 at 1029      Medication Order Taking? Sig Documenting Provider Last Dose Status   albuterol 90 mcg/actuation inhaler [id number]  Inhale 2 puffs every 4 hours if needed for wheezing or shortness of breath. Aida Serrano, APRN-CNP   25 2359   insulin glargine (Basaglar KwikPen U-100 Insulin) 100 unit/mL (3 mL) pen [id number] Yes Inject under the skin once daily at bedtime. Take as directed per insulin instructions. Historical Provider, MD  Active   insulin lispro 100 unit/mL injection [id number] Yes Inject under the skin 3 times daily (morning, midday, late afternoon). Take as directed per insulin instructions. Historical Provider, MD  Active                  PMSx:  Surgical History[3]    Social Hx:     Social History     Tobacco Use    Smoking status: Never    Smokeless tobacco: Never   Substance Use Topics    Alcohol use: Not on file       The patient is a 30-year-old male who presents to the urgent care with a chief complaint of right arm pain.  He states that he has had right arm pain for approximately 3 days.  He reports some swelling mainly around his elbow and distal forearm.  He states that he has pain with movement such as rotation, gripping an item, and at this point any movement.  He reports there is some swelling but denies any redness.  He denies any direct trauma or injury.  The pain radiates from his right elbow to his mid forearm.  He states that last night he had difficulty sleeping secondary to the pain.  Patient states that he has  been golfing a lot lately.  He denies any chest pain or shortness of breath.  No fever, chills, nausea or vomiting.  Patient has taken ibuprofen with minimal relief.  He does have a history of gout.            Review of Systems   Constitutional:  Negative for fever.   HENT:  Negative for congestion and facial swelling.    Respiratory:  Negative for shortness of breath.    Cardiovascular:  Negative for chest pain and palpitations.   Musculoskeletal:         Right elbow pain   Skin:  Negative for color change, rash and wound.         Physical Exam  Vitals and nursing note reviewed.   Constitutional:       Appearance: Normal appearance.   HENT:      Head: Normocephalic and atraumatic.      Right Ear: Tympanic membrane, ear canal and external ear normal.      Left Ear: Tympanic membrane, ear canal and external ear normal.      Mouth/Throat:      Mouth: Mucous membranes are moist.   Eyes:      Extraocular Movements: Extraocular movements intact.      Conjunctiva/sclera: Conjunctivae normal.      Pupils: Pupils are equal, round, and reactive to light.   Cardiovascular:      Rate and Rhythm: Normal rate and regular rhythm.   Pulmonary:      Effort: Pulmonary effort is normal. No respiratory distress.      Breath sounds: Normal breath sounds. No stridor. No wheezing, rhonchi or rales.   Abdominal:      Palpations: Abdomen is soft.   Musculoskeletal:         General: Normal range of motion.      Cervical back: Normal range of motion and neck supple. No rigidity.      Comments: There is some mild swelling to the right elbow, tenderness to the lateral epicondyles.  No erythema.  Pain with supination.  Decreased  strength can [place] to pain.  Distal pulses are strong, capillary refills less than 2 seconds.  He does have full range of motion at the right elbow joint although it is painful with movement.   Skin:     General: Skin is warm and dry.   Neurological:      General: No focal deficit present.      Mental Status: He is  alert.   Psychiatric:         Mood and Affect: Mood normal.       Vitals:    06/12/25 1025   BP: (!) 145/96   Pulse: (!) 115   Resp: 16   Temp: 36.6 °C (97.8 °F)   SpO2: 97%         LABORATORY @ RADIOLOGICAL IMAGING (if done):   Imaging  No results found.    Cardiology, Vascular, and Other Imaging  No other imaging results found for the past 2 days     No results found for this or any previous visit (from the past 24 hours).       COURSE/MEDICAL DECISION MAKING:  Patient is afebrile and nontoxic-appearing.  Presents with a right arm pain.  After further examination patient has tenderness to his lateral epicondyle, some swelling about this area with no appreciated erythema.  There is pain especially with supination and decreased  strength is appreciated.  Patient does admit that he has been golfing a lot lately.  No direct trauma no indication for imaging at this time.  I do suspect patient has tennis elbow/lateral epicondylitis given his history and physical exam findings.  He will be prescribed anti-inflammatory medication and given his pain he was also prescribed tramadol.  OARRS report was reviewed.  He was given ketorolac in clinic. Patient is neurovascularly intact.  He was noted to be tachycardic upon arrival.  I did recheck the patient's pulse and he was at 105 upon recheck.  I discussed the patient's heart rate with him and he states that this is typical for him especially when he is in pain.  He denies chest pain or shortness of breath.  Given his his physical exam findings I do not suspect a DVT but instructed patient to follow-up with his PCP or go to the ED if there is no improvement of his symptoms or if he develops any new or worsening symptoms.  He verbalizes understanding.  Rest, ice and elevation recommended and follow-up with orthopedics.    Differential diagnosis includes but not limited to tendinitis, lateral or medial epicondylitis, fracture, contusion, sprain, septic joint, gout  Problem  List Items Addressed This Visit    None  Visit Diagnoses         Lateral epicondylitis of right elbow    -  Primary    Relevant Medications    ketorolac (Toradol) injection 30 mg (Completed)    predniSONE (Deltasone) 50 mg tablet    traMADol (Ultram) 50 mg tablet                Shanna Kelsey PA-C   Advanced Practice Provider  [facility]         [1]   Past Medical History:  Diagnosis Date    Anesthesia of skin 08/09/2022    Numbness of hand    Anesthesia of skin 08/09/2022    Numbness of fingers    Anesthesia of skin 08/09/2022    Numbness and tingling    Personal history of other diseases of the digestive system     History of acute pancreatitis   [2] No Known Allergies  [3]   Past Surgical History:  Procedure Laterality Date    OTHER SURGICAL HISTORY  04/28/2022    [place] tooth extraction    OTHER SURGICAL HISTORY  04/28/2022    Tonsillectomy    OTHER SURGICAL HISTORY  11/30/2022    Abscess incision and drainage    OTHER SURGICAL HISTORY  11/30/2022    Carpal tunnel surgery